# Patient Record
Sex: FEMALE | Race: BLACK OR AFRICAN AMERICAN | NOT HISPANIC OR LATINO | URBAN - METROPOLITAN AREA
[De-identification: names, ages, dates, MRNs, and addresses within clinical notes are randomized per-mention and may not be internally consistent; named-entity substitution may affect disease eponyms.]

---

## 2018-11-01 ENCOUNTER — OUTPATIENT (OUTPATIENT)
Dept: OUTPATIENT SERVICES | Facility: HOSPITAL | Age: 33
LOS: 1 days | End: 2018-11-01
Payer: MEDICAID

## 2018-11-01 PROCEDURE — G9001: CPT

## 2018-11-17 ENCOUNTER — EMERGENCY (EMERGENCY)
Facility: HOSPITAL | Age: 33
LOS: 1 days | Discharge: HOME | End: 2018-11-17
Attending: EMERGENCY MEDICINE
Payer: SELF-PAY

## 2018-11-17 VITALS
SYSTOLIC BLOOD PRESSURE: 164 MMHG | HEIGHT: 69 IN | RESPIRATION RATE: 18 BRPM | HEART RATE: 88 BPM | WEIGHT: 149.91 LBS | DIASTOLIC BLOOD PRESSURE: 98 MMHG | TEMPERATURE: 98 F | OXYGEN SATURATION: 100 %

## 2018-11-17 PROCEDURE — 99284 EMERGENCY DEPT VISIT MOD MDM: CPT

## 2018-11-17 PROCEDURE — 70450 CT HEAD/BRAIN W/O DYE: CPT

## 2018-11-17 PROCEDURE — 99284 EMERGENCY DEPT VISIT MOD MDM: CPT | Mod: 25

## 2018-11-17 PROCEDURE — 70450 CT HEAD/BRAIN W/O DYE: CPT | Mod: 26

## 2018-11-17 RX ORDER — ACETAMINOPHEN 500 MG
975 TABLET ORAL ONCE
Qty: 0 | Refills: 0 | Status: COMPLETED | OUTPATIENT
Start: 2018-11-17 | End: 2018-11-17

## 2018-11-17 RX ADMIN — Medication 975 MILLIGRAM(S): at 20:03
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
## 2018-11-26 DIAGNOSIS — Z71.89 OTHER SPECIFIED COUNSELING: ICD-10-CM

## 2018-12-04 ENCOUNTER — RESULT REVIEW (OUTPATIENT)
Age: 33
End: 2018-12-04

## 2019-12-16 ENCOUNTER — EMERGENCY (EMERGENCY)
Facility: HOSPITAL | Age: 34
LOS: 1 days | Discharge: HOME | End: 2019-12-16
Attending: EMERGENCY MEDICINE
Payer: MEDICAID

## 2019-12-16 VITALS
HEIGHT: 69 IN | WEIGHT: 156.09 LBS | DIASTOLIC BLOOD PRESSURE: 78 MMHG | OXYGEN SATURATION: 100 % | SYSTOLIC BLOOD PRESSURE: 118 MMHG | HEART RATE: 83 BPM | TEMPERATURE: 98 F | RESPIRATION RATE: 18 BRPM

## 2019-12-16 PROCEDURE — 99283 EMERGENCY DEPT VISIT LOW MDM: CPT | Mod: 25

## 2019-12-16 PROCEDURE — 10060 I&D ABSCESS SIMPLE/SINGLE: CPT

## 2019-12-16 PROCEDURE — 10060 I&D ABSCESS SIMPLE/SINGLE: CPT | Mod: RT

## 2019-12-16 NOTE — ED ADULT NURSE NOTE - CHIEF COMPLAINT QUOTE
c/o pain and cysts to right axilla, had surgery 3 weeks ago on left axilla to have  cysts removed, and now wound is leaking and not healing

## 2019-12-16 NOTE — ED STATDOCS - OBJECTIVE STATEMENT
33 y/o F pt with hx of hydradenitis suppurativa 3 weeks s/p left axilla cyst removal presents c/o right axilla cyst. Denies fever or chills. No further complaints at this time.  Plastic: Dr. Lomeli with [facility]

## 2019-12-16 NOTE — ED ADULT TRIAGE NOTE - CHIEF COMPLAINT QUOTE
c/o pain to right axilla, had surgery 3 weeks ago , states had cysts removed, and now wound is leaking and not healing c/o pain and cysts to right axilla, had surgery 3 weeks ago on left axilla to have  cysts removed, and now wound is leaking and not healing

## 2019-12-16 NOTE — ED STATDOCS - ATTENDING CONTRIBUTION TO CARE
I, Candice Lema, performed the initial face to face bedside interview with this patient regarding history of present illness, review of symptoms and relevant past medical, social and family history.  I completed an independent physical examination.  I was the initial provider who evaluated this patient. I have signed out the follow up of any pending tests (i.e. labs, radiological studies) to the ACP.  I have communicated the patient’s plan of care and disposition with the ACP.  The history, relevant review of systems, past medical and surgical history, medical decision making, and physical examination was documented by the scribe in my presence and I attest to the accuracy of the documentation.

## 2019-12-16 NOTE — ED STATDOCS - PROGRESS NOTE DETAILS
incison and drainage preformed, antibiotics to pharmacy, pt has appt with surgeon on friday will f/u return for worsening symptoms warm compressess

## 2020-09-24 PROBLEM — Z00.00 ENCOUNTER FOR PREVENTIVE HEALTH EXAMINATION: Status: ACTIVE | Noted: 2020-09-24

## 2020-09-25 ENCOUNTER — APPOINTMENT (OUTPATIENT)
Dept: BREAST CENTER | Facility: CLINIC | Age: 35
End: 2020-09-25
Payer: MEDICAID

## 2020-09-25 VITALS
HEART RATE: 91 BPM | OXYGEN SATURATION: 99 % | HEIGHT: 69 IN | WEIGHT: 210 LBS | SYSTOLIC BLOOD PRESSURE: 124 MMHG | DIASTOLIC BLOOD PRESSURE: 83 MMHG | BODY MASS INDEX: 31.1 KG/M2 | TEMPERATURE: 98.4 F

## 2020-09-25 DIAGNOSIS — R92.8 OTHER ABNORMAL AND INCONCLUSIVE FINDINGS ON DIAGNOSTIC IMAGING OF BREAST: ICD-10-CM

## 2020-09-25 DIAGNOSIS — L73.2 HIDRADENITIS SUPPURATIVA: ICD-10-CM

## 2020-09-25 DIAGNOSIS — N64.4 MASTODYNIA: ICD-10-CM

## 2020-09-25 PROCEDURE — 99204 OFFICE O/P NEW MOD 45 MIN: CPT

## 2020-09-25 NOTE — ASSESSMENT
[FreeTextEntry1] : 35 yo presents with abnormal imaging and suppurativa hidradenitis.  She has raised red bumps in both axillas.  Recommendation for topical antibiotics and repeat short term imaging.  Clinical exam is to follow.\par 1.  Topical antibiotics for 2 weeks BID\par 2.  Naproxen for pain PRN\par 3.  Short term follow up imaging in 6 months 2/2020\par 4.  Follow up in 6 months 2/2020

## 2020-09-25 NOTE — PHYSICAL EXAM
[Normocephalic] : normocephalic [Atraumatic] : atraumatic [EOMI] : extra ocular movement intact [PERRL] : pupils equal, round and reactive to light [Sclera nonicteric] : sclera nonicteric [Supple] : supple [No Supraclavicular Adenopathy] : no supraclavicular adenopathy [Examined in the supine and seated position] : examined in the supine and seated position [No dominant masses] : no dominant masses in right breast  [No dominant masses] : no dominant masses left breast [No Nipple Retraction] : no left nipple retraction [No Nipple Discharge] : no left nipple discharge [No Axillary Lymphadenopathy] : no left axillary lymphadenopathy [No Edema] : no edema [No Rashes] : no rashes [No Ulceration] : no ulceration [de-identified] : No supraclavicular or axillary adenopathy. No dominant masses, normal to palpation. Everted nipple without discharge. No skin changes.\par \par  [de-identified] : No supraclavicular or axillary adenopathy. No dominant masses, normal to palpation. Everted nipple without discharge. No skin changes.\par \par  [de-identified] : raised red bumps in both axillas

## 2020-09-25 NOTE — HISTORY OF PRESENT ILLNESS
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  She states she has been followed by her OG-GYN for years for her chronic hidradenitis suppurative.  She stopped shaving her arm pit a long time ago and only applies deodarant sometimes.  She reported to her GYN she had bilateral breast tenderness and underwent imaging on 8/25/2020.  She was recommended to undergo repeat short term bilateral US due in Febraury due to bilateral axillary lymph nodes.\par \par Impression:  No mammographic or ultrasonographic evidence of malignancy.  Recommend follow up bilateral mammogram at age 40 or earlier if clinically indicated.  The patient notes intermittent bilateral breast tenderness.  The patient states that her provider noted palpable  areas of concern under both arms but could not specify the exact location of the areas noted by her provider.  Hypocholic mass with in the skin are noted in the axillary region bilaterally on sonography.  The patient notes that these have been present for the past 5 years.  The largest measures up to 0.7 cm.  These are most consistent with sebaceus cyst.  A superimposed infection cannot be excluded.  Further imaging may be obtained if clinical symptoms persist, to ensure resolution or if there is clinical concern for breast cancer.\par Survey sonography of the right axillary region demonstrates a mildly prominent lymph node with slight smooth cortical thickening as well as a preserved fatty hilum.  This may be reactive in etiology.  Short term interval with sonography in 6 months is recommended to ensure stability.  BIRADS 3\par \par I reviewed clinical exam and recent imaging.  Underneath both axilla there are raised red bumps and I explained what hidradenitis suppurative is and the management.  She understands she is not to shave or put deodorant on while she is having a flare up.  She is to apply topical antibiotics to the area BID for 2 weeks.  If clinical breast exam is stable and benign then short term follow up imaging in February with clinical exam to follow.  She understands and agrees to plan.  Any changes in her breast, then she is to call the office for a sooner appointment.

## 2020-10-01 ENCOUNTER — EMERGENCY (EMERGENCY)
Facility: HOSPITAL | Age: 35
LOS: 1 days | Discharge: HOME | End: 2020-10-01
Attending: EMERGENCY MEDICINE
Payer: MEDICAID

## 2020-10-01 VITALS
DIASTOLIC BLOOD PRESSURE: 77 MMHG | HEIGHT: 69 IN | TEMPERATURE: 98 F | OXYGEN SATURATION: 100 % | SYSTOLIC BLOOD PRESSURE: 121 MMHG | HEART RATE: 96 BPM | WEIGHT: 160.06 LBS | RESPIRATION RATE: 18 BRPM

## 2020-10-01 PROCEDURE — 10061 I&D ABSCESS COMP/MULTIPLE: CPT

## 2020-10-01 PROCEDURE — 99283 EMERGENCY DEPT VISIT LOW MDM: CPT | Mod: 25

## 2020-10-01 PROCEDURE — 99282 EMERGENCY DEPT VISIT SF MDM: CPT | Mod: 25

## 2020-10-01 NOTE — ED PROVIDER NOTE - PATIENT PORTAL LINK FT
You can access the FollowMyHealth Patient Portal offered by [facility] by registering at the following website: [email or web address]. By joining [employer]’s FollowMyHealth portal, you will also be able to view your health information using other applications (apps) compatible with our system.

## 2020-10-01 NOTE — ED PROVIDER NOTE - PHYSICAL EXAMINATION
Skin: bilateral hydradenitis, with 1cm area of fluctuance and tenderness on left axilla, no erythema

## 2020-10-01 NOTE — ED PROVIDER NOTE - OBJECTIVE STATEMENT
34 y/o F with hx of hydradenitis c/o abscess on left axilla.  Patient is following up with a surgeon.  Denies fever.  Patient is using topical clindamycin.

## 2020-10-01 NOTE — ED ADULT TRIAGE NOTE - CHIEF COMPLAINT QUOTE
c/o pain to both armpit areas, states have abcesses to both, c/o pain, went to [facility] yesterday was given a med, feels it made it worse

## 2020-10-13 ENCOUNTER — APPOINTMENT (OUTPATIENT)
Dept: SURGERY | Facility: CLINIC | Age: 35
End: 2020-10-13
Payer: MEDICAID

## 2020-10-13 ENCOUNTER — APPOINTMENT (OUTPATIENT)
Dept: SURGERY | Facility: CLINIC | Age: 35
End: 2020-10-13

## 2020-10-13 VITALS
HEART RATE: 84 BPM | SYSTOLIC BLOOD PRESSURE: 147 MMHG | TEMPERATURE: 98.3 F | HEIGHT: 69 IN | DIASTOLIC BLOOD PRESSURE: 94 MMHG | WEIGHT: 213 LBS | BODY MASS INDEX: 31.55 KG/M2 | OXYGEN SATURATION: 100 %

## 2020-10-13 DIAGNOSIS — L73.2 HIDRADENITIS SUPPURATIVA: ICD-10-CM

## 2020-10-13 PROCEDURE — 99214 OFFICE O/P EST MOD 30 MIN: CPT

## 2020-10-14 PROBLEM — L73.2 SUPPURATIVE HIDRADENITIS: Status: ACTIVE | Noted: 2020-09-25

## 2020-10-14 NOTE — PHYSICAL EXAM
[Normocephalic] : normocephalic [Atraumatic] : atraumatic [EOMI] : extra ocular movement intact [PERRL] : pupils equal, round and reactive to light [Sclera nonicteric] : sclera nonicteric [Supple] : supple [No Supraclavicular Adenopathy] : no supraclavicular adenopathy [Examined in the supine and seated position] : examined in the supine and seated position [No Axillary Lymphadenopathy] : no left axillary lymphadenopathy [No Edema] : no edema [No Rashes] : no rashes [de-identified] : \par \par  [No Ulceration] : no ulceration [de-identified] : \par \par  [de-identified] : raised red bumps in both axillas

## 2020-10-14 NOTE — HISTORY OF PRESENT ILLNESS
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  She states she has been followed by her OG-GYN for years for her chronic hidradenitis suppurative.  She stopped shaving her arm pit a long time ago and only applies deodarant sometimes.  She reported to her GYN she had bilateral breast tenderness and underwent imaging on 8/25/2020.  She was recommended to undergo repeat short term bilateral US due in Febraury due to bilateral axillary lymph nodes.\par \par Impression:  No mammographic or ultrasonographic evidence of malignancy.  Recommend follow up bilateral mammogram at age 40 or earlier if clinically indicated.  The patient notes intermittent bilateral breast tenderness.  The patient states that her provider noted palpable  areas of concern under both arms but could not specify the exact location of the areas noted by her provider.  Hypocholic mass with in the skin are noted in the axillary region bilaterally on sonography.  The patient notes that these have been present for the past 5 years.  The largest measures up to 0.7 cm.  These are most consistent with sebaceus cyst.  A superimposed infection cannot be excluded.  Further imaging may be obtained if clinical symptoms persist, to ensure resolution or if there is clinical concern for breast cancer.\par Survey sonography of the right axillary region demonstrates a mildly prominent lymph node with slight smooth cortical thickening as well as a preserved fatty hilum.  This may be reactive in etiology.  Short term interval with sonography in 6 months is recommended to ensure stability.  BIRADS 3\par \par I reviewed clinical exam and it is progressing even with topical antibiotics and sitz bath.  We discussed her meeting with a plastics surgeon for surgery due to her hidradenitis suppurativa.  She agrees with plan and will consult with  for surgery.  All questions answered.

## 2020-10-14 NOTE — ASSESSMENT
[FreeTextEntry1] : 33 yo presents for follow up secondary to suppurativa hidradenitis.  She has raised red bumps in both axillas.  I reviewed clinical exam and it is progressing even with topical antibiotics and sitz bath.  We discussed her meeting with a plastics surgeon for surgery due to her hidradenitis suppurativa.  She agrees with plan and will consult with  for surgery.\par 1.  Consult with Dr. Munroe

## 2020-11-18 ENCOUNTER — APPOINTMENT (OUTPATIENT)
Dept: PLASTIC SURGERY | Facility: CLINIC | Age: 35
End: 2020-11-18

## 2020-11-25 ENCOUNTER — TRANSCRIPTION ENCOUNTER (OUTPATIENT)
Age: 35
End: 2020-11-25

## 2022-04-18 ENCOUNTER — NON-APPOINTMENT (OUTPATIENT)
Age: 37
End: 2022-04-18

## 2022-04-20 ENCOUNTER — APPOINTMENT (OUTPATIENT)
Dept: PULMONOLOGY | Facility: CLINIC | Age: 37
End: 2022-04-20
Payer: MEDICAID

## 2022-04-20 ENCOUNTER — NON-APPOINTMENT (OUTPATIENT)
Age: 37
End: 2022-04-20

## 2022-04-20 VITALS — BODY MASS INDEX: 32.64 KG/M2 | WEIGHT: 221 LBS

## 2022-04-20 VITALS
DIASTOLIC BLOOD PRESSURE: 70 MMHG | SYSTOLIC BLOOD PRESSURE: 120 MMHG | RESPIRATION RATE: 16 BRPM | HEART RATE: 73 BPM | OXYGEN SATURATION: 96 %

## 2022-04-20 DIAGNOSIS — R91.1 SOLITARY PULMONARY NODULE: ICD-10-CM

## 2022-04-20 DIAGNOSIS — Z87.2 PERSONAL HISTORY OF DISEASES OF THE SKIN AND SUBCUTANEOUS TISSUE: ICD-10-CM

## 2022-04-20 DIAGNOSIS — Z87.891 PERSONAL HISTORY OF NICOTINE DEPENDENCE: ICD-10-CM

## 2022-04-20 DIAGNOSIS — Z78.9 OTHER SPECIFIED HEALTH STATUS: ICD-10-CM

## 2022-04-20 DIAGNOSIS — Z82.49 FAMILY HISTORY OF ISCHEMIC HEART DISEASE AND OTHER DISEASES OF THE CIRCULATORY SYSTEM: ICD-10-CM

## 2022-04-20 DIAGNOSIS — R06.02 SHORTNESS OF BREATH: ICD-10-CM

## 2022-04-20 DIAGNOSIS — E66.9 OBESITY, UNSPECIFIED: ICD-10-CM

## 2022-04-20 PROCEDURE — 99204 OFFICE O/P NEW MOD 45 MIN: CPT

## 2022-04-20 RX ORDER — MEDROXYPROGESTERONE ACETATE 150 MG/ML
150 INJECTION, SUSPENSION INTRAMUSCULAR
Refills: 0 | Status: COMPLETED | COMMUNITY
End: 2022-04-20

## 2022-04-20 RX ORDER — MUPIROCIN 2 G/100G
2 CREAM TOPICAL TWICE DAILY
Qty: 1 | Refills: 0 | Status: COMPLETED | COMMUNITY
Start: 2020-09-25 | End: 2022-04-20

## 2022-04-20 RX ORDER — NAPROXEN 500 MG/1
500 TABLET, DELAYED RELEASE ORAL
Qty: 60 | Refills: 0 | Status: COMPLETED | COMMUNITY
Start: 2020-09-25 | End: 2022-04-20

## 2022-04-20 RX ORDER — CLINDAMYCIN PHOSPHATE 10 MG/ML
1 LOTION TOPICAL TWICE DAILY
Qty: 1 | Refills: 0 | Status: COMPLETED | COMMUNITY
Start: 2020-09-30 | End: 2022-04-20

## 2022-04-20 NOTE — REVIEW OF SYSTEMS
[SOB on Exertion] : sob on exertion [Chest Discomfort] : chest discomfort [Anemia] : anemia [Negative] : Endocrine [Back Pain] : no back pain

## 2022-04-20 NOTE — DISCUSSION/SUMMARY
[FreeTextEntry1] : \par #1. Will schedule PFTs in near future to assess lung function \par #2. The patient does not appear to require chronic BD therapy at this time\par #3. Diet and exercise for weight loss\par #4. SOBOE is likely at least somewhat related to weight or deconditioning \par #5. Repeat chest CT in 3 months to re-evaluate 8 mm nodule; low risk for malignancy given young age and brief smoking hx (< 10 pk yrs and quit in 2017)\par #6. Pt had both Covid vaccines; rec booster\par #7. Discomfort and SOB may be musculoskeletal as it is reproducible with deep inspiration\par #8. F/u in 4-6 weeks with PFTs and films\par #9. Reviewed risks of exposure and symptoms of Covid-19 virus, including how the virus is spread and precautions to avoid monica virus.\par \par The patient expressed understanding and agreement with the above recommendations/plan and accepts responsibility to be compliant with recommended testing, therapies, and f/u visits.\par All relevant questions and concerns were addressed.

## 2022-04-20 NOTE — PHYSICAL EXAM
[No Acute Distress] : no acute distress [Well Nourished] : well nourished [Well Developed] : well developed [Normal Appearance] : normal appearance [Supple] : supple [Normal Rate/Rhythm] : normal rate/rhythm [Normal S1, S2] : normal s1, s2 [No Murmurs] : no murmurs [No Resp Distress] : no resp distress [No Acc Muscle Use] : no acc muscle use [Normal Rhythm and Effort] : normal rhythm and effort [Clear to Auscultation Bilaterally] : clear to auscultation bilaterally [No Abnormalities] : no abnormalities [Benign] : benign [Not Tender] : not tender [Soft] : soft [No Clubbing] : no clubbing [No Edema] : no edema [Oriented x3] : oriented x3 [No Focal Deficits] : no focal deficits [TextBox_2] : Pt is obese

## 2022-04-20 NOTE — REASON FOR VISIT
[Initial] : an initial visit [Abnormal CXR/ Chest CT] : an abnormal CXR/ chest CT [Pulmonary Nodules] : pulmonary nodules [Shortness of Breath] : shortness of breath [Obesity] : obesity [TextBox_44] : Smoking hx

## 2022-04-20 NOTE — CONSULT LETTER
[Dear  ___] : Dear  [unfilled], [Consult Letter:] : I had the pleasure of evaluating your patient, [unfilled]. [Please see my note below.] : Please see my note below. [Consult Closing:] : Thank you very much for allowing me to participate in the care of this patient.  If you have any questions, please do not hesitate to contact me. [Sincerely,] : Sincerely, [FreeTextEntry3] : Leander Vyas MD, FCCP, D. ABSM\par Pulmonary and Sleep Medicine\par [facility] at [place]

## 2022-04-20 NOTE — HISTORY OF PRESENT ILLNESS
[Former] : former [Never] : never [On ___] : performed on [unfilled] [Patient] : the patient [To Assess ___] : to assess [unfilled] [Initial Evaluation] : an initial evaluation of [Excess Weight] : excess weight [Currently Experiencing] : The patient is currently experiencing symptoms. [Dyspnea] : dyspnea [Low Calorie Diet] : low calorie diet [Fair Compliance] : fair compliance with treatment [Fair Tolerance] : fair tolerance of treatment [Poor Symptom Control] : poor symptom control [None] : No associated conditions are noted [High] : high [Low Calorie] : low calorie [Well Balanced Diet] : well balanced meals [Infrequently] : exercises infrequently [TextBox_4] : Pt reports a prior RUL opacity thought to be an abscess but she reports nothing was done. She denies drainage or abx. Currently there appears to be a small residual scar in that area but reportedly is essentially resolved. This may be contributing to her chest discomfort if the pleura was involved at that time and now may have scarring.\par Pt was seen in [facility] for chest pain and SOB. W/u including CTA revealed an 8 mm peripheral nodule but no PE.\par Pt reports that she woke up with these symptoms and are reproducible with deep breathing but relatively asymptomatic at rest.\par < 10 pk yr smoker, quit 2017.\par  [TextBox_11] : 1 [TextBox_13] : 10 [YearQuit] : 2017 [FreeTextEntry9] : Chest CT [FreeTextEntry8] : No PE but with 8 mm RLL nodule

## 2022-06-01 ENCOUNTER — APPOINTMENT (OUTPATIENT)
Dept: PULMONOLOGY | Facility: CLINIC | Age: 37
End: 2022-06-01

## 2022-06-15 ENCOUNTER — NON-APPOINTMENT (OUTPATIENT)
Age: 37
End: 2022-06-15

## 2022-07-25 ENCOUNTER — EMERGENCY (EMERGENCY)
Facility: HOSPITAL | Age: 37
LOS: 0 days | Discharge: HOME | End: 2022-07-25
Attending: EMERGENCY MEDICINE
Payer: COMMERCIAL

## 2022-07-25 VITALS
HEART RATE: 64 BPM | TEMPERATURE: 98 F | RESPIRATION RATE: 18 BRPM | SYSTOLIC BLOOD PRESSURE: 154 MMHG | OXYGEN SATURATION: 100 % | DIASTOLIC BLOOD PRESSURE: 95 MMHG

## 2022-07-25 VITALS — WEIGHT: 220.02 LBS | HEIGHT: 69 IN

## 2022-07-25 DIAGNOSIS — M54.2 CERVICALGIA: ICD-10-CM
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
## 2022-07-25 DIAGNOSIS — F84.0 AUTISTIC DISORDER: ICD-10-CM
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
## 2022-07-25 DIAGNOSIS — S16.1XXA STRAIN OF MUSCLE, FASCIA AND TENDON AT NECK LEVEL, INITIAL ENCOUNTER: ICD-10-CM

## 2022-07-25 DIAGNOSIS — R51.9 HEADACHE, UNSPECIFIED: ICD-10-CM

## 2022-07-25 PROCEDURE — 99283 EMERGENCY DEPT VISIT LOW MDM: CPT

## 2022-07-25 RX ORDER — IBUPROFEN 200 MG
600 TABLET ORAL ONCE
Refills: 0 | Status: COMPLETED | OUTPATIENT
Start: 2022-07-25 | End: 2022-07-25

## 2022-07-25 RX ORDER — CYCLOBENZAPRINE HYDROCHLORIDE 10 MG/1
1 TABLET, FILM COATED ORAL
Qty: 15 | Refills: 0
Start: 2022-07-25 | End: 2022-07-29

## 2022-07-25 RX ADMIN — Medication 600 MILLIGRAM(S): at 11:52
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
## 2022-07-25 NOTE — ED STATDOCS - ATTENDING SHARED VISIT SELECTORS
Addended by: GEORGI CORBIN on: 2/16/2018 11:40 AM     Modules accepted: Orders    
History/Exam/Medical Decision Making

## 2022-07-25 NOTE — ED STATDOCS - PATIENT PORTAL LINK FT
You can access the FollowMyHealth Patient Portal offered by [facility] by registering at the following website: [email or web address]. By joining [employer]’s FollowMyHealth portal, you will also be able to view your health information using other applications (apps) compatible with our system.

## 2022-07-25 NOTE — ED STATDOCS - NSFOLLOWUPINSTRUCTIONS_ED_ALL_ED_FT
Cervical Sprain      A cervical sprain is a stretch or tear in one or more of the ligaments in the neck. Ligaments are the tissues that connect bones. Cervical sprains can range from mild to severe. Severe cervical sprains can cause the spinal bones (vertebrae) in the neck to be unstable. This can result in spinal cord damage and in serious nervous system problems.    The time that it takes for a cervical sprain to heal depends on the cause and extent of the injury. Most cervical sprains heal in 4–6 weeks.      What are the causes?    Cervical sprains may be caused by trauma, such as an injury from a motor vehicle accident, a fall, or a sudden forward and backward whipping movement of the head and neck (whiplash injury). Mild cervical sprains may be caused by wear and tear over time.      What increases the risk?    The following factors may make you more likely to develop this condition:  •Participating in activities that have a high risk of trauma to the neck. These include contact sports, auto racing, gymnastics, and diving.      •Taking risks when driving or riding in a motor vehicle.      •Osteoarthritis of the spine.      •Poor strength and flexibility of the neck.      •A previous neck injury.      •Poor posture.      •Spending long periods in certain positions that put stress on the neck, such as sitting at a computer for a long time.        What are the signs or symptoms?    Symptoms of this condition include:  •Pain, soreness, stiffness, tenderness, swelling, or a burning sensation in the front, back, or sides of the neck, shoulders, or upper back.      •Sudden tightening of neck muscles (spasms).      •Limited ability to move the neck.      •Headache.      •Dizziness.      •Nausea or vomiting.      •Weakness, numbness, or tingling in a hand or an arm.      Symptoms may develop right away after injury, or they may develop over a few days. In some cases, symptoms may go away with treatment and return (recur) over time.      How is this diagnosed?    This condition may be diagnosed based on:  •Your medical history.      •Your symptoms.      •Any recent injuries or known neck problems that you have, such as arthritis in the neck.      •A physical exam.      •Imaging tests, such as X-rays, MRI, and CT scan.        How is this treated?    This condition is treated by resting and icing the injured area and doing physical therapy exercises. Heat therapy may be used 2–3 days after the injury occurred if there is no swelling. Depending on the severity of your condition, treatment may also include:•Keeping your neck in place (immobilized) for periods of time. This may be done using:  •A cervical collar. This supports your chin and the back of your head.      •A cervical traction device. This is a sling that holds up your head. The device removes weight and pressure from your neck, and it may help to relieve pain.        •Medicines that help to relieve pain and inflammation.      •Medicines that help to relax your muscles (muscle relaxants).      •Surgery. This is rare.        Follow these instructions at home:      Medicines      •Take over-the-counter and prescription medicines only as told by your health care provider.    •Ask your health care provider if the medicine prescribed to you:  •Requires you to avoid driving or using heavy machinery.    •Can cause constipation. You may need to take these actions to prevent or treat constipation:  •Drink enough fluid to keep your urine pale yellow.      •Take over-the-counter or prescription medicines.      •Eat foods that are high in fiber, such as beans, whole grains, and fresh fruits and vegetables.      •Limit foods that are high in fat and processed sugars, such as fried or sweet foods.          If you have a cervical collar:     •Wear the collar as told by your health care provider. Do not remove it unless told.      •Ask before making any adjustments to your collar.      •If you have long hair, keep it outside of the collar.    •Ask your health care provider if you may remove the collar for cleaning and bathing. If so:  •Follow instructions about how to remove it safely.      •Clean it by hand with mild soap and water and air-dry it completely.      •If your collar has removable pads, remove them every 1–2 days and wash them by hand with soap and water. Let them air-dry completely before putting them back in the collar.        •Tell your health care provider if your skin under the collar has irritation or sores.        Managing pain, stiffness, and swelling                   •If directed, use a cervical traction device as told.    •If directed, put ice on the affected area. To do this:  •Put ice in a plastic bag.      •Place a towel between your skin and the bag.      •Leave the ice on for 20 minutes, 2–3 times a day.      •If directed, apply heat to the affected area before you do your physical therapy or as often as told by your health care provider. Use the heat source that your health care provider recommends, such as a moist heat pack or a heating pad.  •Place a towel between your skin and the heat source.      •Leave the heat on for 20–30 minutes.      •Remove the heat if your skin turns bright red. This is especially important if you are unable to feel pain, heat, or cold. You may have a greater risk of getting burned.        Activity     • Do not drive while wearing a cervical collar. If you do not have a cervical collar, ask if it is safe to drive while your neck heals.      • Do not lift anything that is heavier than 10 lb (4.5 kg), or the limit that you are told, until your health care provider says that it is safe.      •Rest as told by your health care provider.      •If physical therapy was prescribed, do exercises as told by your health care provider or physical therapist.      •Return to your normal activities as told by your health care provider. Avoid positions and activities that make your symptoms worse. Ask your health care provider what activities are safe for you.      General instructions     • Do not use any products that contain nicotine or tobacco, such as cigarettes, e-cigarettes, and chewing tobacco. These can delay healing. If you need help quitting, ask your health care provider.      •Keep all follow-up visits as told by your health care provider or physical therapist. This is important.        How is this prevented?    To prevent a cervical sprain from happening again:  •Use and maintain good posture. Make any needed adjustments to your workstation to help you do this.      •Exercise regularly as told by your health care provider or physical therapist.      •Avoid risky activities that may cause a cervical sprain.        Contact a health care provider if you have:    •Symptoms that get worse or do not get better after 2 weeks of treatment.      •Pain that gets worse or does not get better with medicine.      •New, unexplained symptoms.      •Sores or irritated skin on your neck from wearing your cervical collar.        Get help right away if:    •You have severe pain.      •You develop numbness, tingling, or weakness in any part of your body.      •You cannot move a part of your body (you have paralysis).      •You have neck pain along with severe dizziness or headache.        Summary    •A cervical sprain is a stretch or tear in one or more of the ligaments in the neck.      •Cervical sprains may be caused by trauma, such as an injury from a motor vehicle accident, a fall, or a sudden forward and backward whipping movement of the head and neck (whiplash injury).      •Symptoms may develop right away after injury, or they may develop over a few days.      •This condition may be treated with rest, ice, heat, medicines, physical therapy, and surgery.

## 2022-07-25 NOTE — ED STATDOCS - NS ED ROS FT
Constitutional: No fever or chills  Eyes: No visual changes  HEENT: No throat pain  CV: No chest pain  Resp: No SOB no cough  GI: No abd pain, nausea or vomiting  : No dysuria  MSK: +neck pain   Skin: No rash  Neuro: + headache
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
## 2022-07-25 NOTE — ED STATDOCS - PHYSICAL EXAMINATION
Constitutional: NAD AOx3  Eyes: PERRL EOMI  Head: Normocephalic atraumatic  Mouth: MMM  Cardiac: regular rate and rhythm  Resp: Lungs CTAB  GI: Abd s/nd/nt  Neuro: CN2-12 grossly intact, ORELLANA x 4  Skin: No visible rashes  MSK: b/l cervical paraspinal TTP.
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
## 2022-07-25 NOTE — ED STATDOCS - NS ED ATTENDING STATEMENT MOD
This was a shared visit with the MARIA ELENA. I reviewed and verified the documentation and independently performed the documented:

## 2023-06-06 ENCOUNTER — OUTPATIENT (OUTPATIENT)
Dept: OUTPATIENT SERVICES | Facility: HOSPITAL | Age: 38
LOS: 1 days | End: 2023-06-06

## 2023-06-06 VITALS
DIASTOLIC BLOOD PRESSURE: 90 MMHG | RESPIRATION RATE: 16 BRPM | SYSTOLIC BLOOD PRESSURE: 137 MMHG | HEART RATE: 76 BPM | OXYGEN SATURATION: 98 % | TEMPERATURE: 98 F | WEIGHT: 222.01 LBS | HEIGHT: 69 IN

## 2023-06-06 DIAGNOSIS — Z30.9 ENCOUNTER FOR CONTRACEPTIVE MANAGEMENT, UNSPECIFIED: ICD-10-CM

## 2023-06-06 LAB
BLD GP AB SCN SERPL QL: NEGATIVE — SIGNIFICANT CHANGE UP
HCG UR QL: NEGATIVE — SIGNIFICANT CHANGE UP
HCT VFR BLD CALC: 37.9 % — SIGNIFICANT CHANGE UP (ref 34.5–45)
HGB BLD-MCNC: 11.6 G/DL — SIGNIFICANT CHANGE UP (ref 11.5–15.5)
MCHC RBC-ENTMCNC: 26.4 PG — LOW (ref 27–34)
MCHC RBC-ENTMCNC: 30.6 GM/DL — LOW (ref 32–36)
MCV RBC AUTO: 86.1 FL — SIGNIFICANT CHANGE UP (ref 80–100)
NRBC # BLD: 0 /100 WBCS — SIGNIFICANT CHANGE UP (ref 0–0)
NRBC # FLD: 0 K/UL — SIGNIFICANT CHANGE UP (ref 0–0)
PLATELET # BLD AUTO: 368 K/UL — SIGNIFICANT CHANGE UP (ref 150–400)
RBC # BLD: 4.4 M/UL — SIGNIFICANT CHANGE UP (ref 3.8–5.2)
RBC # FLD: 14.9 % — HIGH (ref 10.3–14.5)
RH IG SCN BLD-IMP: POSITIVE — SIGNIFICANT CHANGE UP
WBC # BLD: 15.93 K/UL — HIGH (ref 3.8–10.5)
WBC # FLD AUTO: 15.93 K/UL — HIGH (ref 3.8–10.5)

## 2023-06-06 RX ORDER — SODIUM CHLORIDE 9 MG/ML
1000 INJECTION, SOLUTION INTRAVENOUS
Refills: 0 | Status: DISCONTINUED | OUTPATIENT
Start: 2023-06-13 | End: 2023-06-27

## 2023-06-06 NOTE — H&P PST ADULT - ATTENDING COMMENTS
36 yo P23LMP 3wks ago presents for laparoscopic bilateral salpingectomy. Patient states she has been on many contraceptive options and states she no longer wants children. Patient requesting sterilization and understands procedure is permanent and not reversible.  Patient also declines long term reversible method with IUD. Patient has 18 yr, 16 yr and 5yr children and states her family is complete. Consent signed and witnessed. Risks and benefits discussed. Risks include but not limited to scar tissue formation, intraop blood loss, anesthesia risks, injury to adjacent organs, etc. All questions and concerns addressed to full satisfaction. Anticipate uncomplicated intraop and postop course. Rosa Isela

## 2023-06-06 NOTE — H&P PST ADULT - LOCATION OF BACK PAIN
herniated discs, follows pain management, pain on and off, does not require prescription medication/lumbar spine

## 2023-06-06 NOTE — H&P PST ADULT - HISTORY OF PRESENT ILLNESS
36 y/o female presents to presurgical testing with diagnosis of encounter for contraceptive management. Pt states her family is complete. Pt used to be on depo injection and is choosing to undergo a permanent method of contraception. Pt is scheduled for a laparoscopic bilateral salpingectomy.

## 2023-06-06 NOTE — H&P PST ADULT - PROBLEM SELECTOR PLAN 1
Patient tentatively scheduled for laparoscopic bilateral salpingectomy for 6/13/23. Pre-op instructions provided. Pt given verbal and written instructions with teach back on chlorhexidine shampoo and pepcid. Pt verbalized understanding with return demonstration.     CBC T&S UCG done.

## 2023-06-08 ENCOUNTER — OUTPATIENT (OUTPATIENT)
Dept: OUTPATIENT SERVICES | Facility: HOSPITAL | Age: 38
LOS: 1 days | End: 2023-06-08

## 2023-06-08 DIAGNOSIS — Z30.9 ENCOUNTER FOR CONTRACEPTIVE MANAGEMENT, UNSPECIFIED: ICD-10-CM

## 2023-06-08 PROBLEM — M51.26 OTHER INTERVERTEBRAL DISC DISPLACEMENT, LUMBAR REGION: Chronic | Status: ACTIVE | Noted: 2023-06-06

## 2023-06-08 LAB
BASOPHILS # BLD AUTO: 0.04 K/UL — SIGNIFICANT CHANGE UP (ref 0–0.2)
BASOPHILS NFR BLD AUTO: 0.3 % — SIGNIFICANT CHANGE UP (ref 0–2)
EOSINOPHIL # BLD AUTO: 0.18 K/UL — SIGNIFICANT CHANGE UP (ref 0–0.5)
EOSINOPHIL NFR BLD AUTO: 1.5 % — SIGNIFICANT CHANGE UP (ref 0–6)
HCT VFR BLD CALC: 36.7 % — SIGNIFICANT CHANGE UP (ref 34.5–45)
HGB BLD-MCNC: 11.6 G/DL — SIGNIFICANT CHANGE UP (ref 11.5–15.5)
IANC: 9.26 K/UL — HIGH (ref 1.8–7.4)
IMM GRANULOCYTES NFR BLD AUTO: 0.5 % — SIGNIFICANT CHANGE UP (ref 0–0.9)
LYMPHOCYTES # BLD AUTO: 1.83 K/UL — SIGNIFICANT CHANGE UP (ref 1–3.3)
LYMPHOCYTES # BLD AUTO: 15.4 % — SIGNIFICANT CHANGE UP (ref 13–44)
MCHC RBC-ENTMCNC: 25.9 PG — LOW (ref 27–34)
MCHC RBC-ENTMCNC: 31.6 GM/DL — LOW (ref 32–36)
MCV RBC AUTO: 81.9 FL — SIGNIFICANT CHANGE UP (ref 80–100)
MONOCYTES # BLD AUTO: 0.55 K/UL — SIGNIFICANT CHANGE UP (ref 0–0.9)
MONOCYTES NFR BLD AUTO: 4.6 % — SIGNIFICANT CHANGE UP (ref 2–14)
NEUTROPHILS # BLD AUTO: 9.26 K/UL — HIGH (ref 1.8–7.4)
NEUTROPHILS NFR BLD AUTO: 77.7 % — HIGH (ref 43–77)
NRBC # BLD: 0 /100 WBCS — SIGNIFICANT CHANGE UP (ref 0–0)
NRBC # FLD: 0 K/UL — SIGNIFICANT CHANGE UP (ref 0–0)
PLATELET # BLD AUTO: 357 K/UL — SIGNIFICANT CHANGE UP (ref 150–400)
RBC # BLD: 4.48 M/UL — SIGNIFICANT CHANGE UP (ref 3.8–5.2)
RBC # FLD: 14.8 % — HIGH (ref 10.3–14.5)
WBC # BLD: 11.92 K/UL — HIGH (ref 3.8–10.5)
WBC # FLD AUTO: 11.92 K/UL — HIGH (ref 3.8–10.5)

## 2023-06-12 ENCOUNTER — TRANSCRIPTION ENCOUNTER (OUTPATIENT)
Age: 38
End: 2023-06-12

## 2023-06-12 NOTE — ASU PATIENT PROFILE, ADULT - ALCOHOL USE HISTORY SINGLE SELECT
Progress Note    Admit Date:  11/22/2021    80 y.o. female with chronic systolic congestive heart failure, nonischemic cardiomyopathy, aortic valve stenosis, pacemaker in place, dementia, hypertension who presented to [facility] ED with complaint of fall at home. She was brought to the ER for evaluation where she was found to have significant bruising to the right side of her face. Imaging obtained showed a right pubic ramus fracture/lateral sacral fracture/small extraperitoneal pelvic hematoma and a small right pelvic sidewall hematoma. She was admitted for further care and evaluation    Subjective:  Denies any pain despite several pelvic fractures. Pleasantly demented. Objective:   No data found. Intake/Output Summary (Last 24 hours) at 11/25/2021 1416  Last data filed at 11/25/2021 0845  Gross per 24 hour   Intake 360 ml   Output --   Net 360 ml     Physical Exam:    Gen: No distress. Alert. Eyes: PERRL. No sclera icterus. No conjunctival injection. ENT: No discharge. Pharynx clear. Neck: Trachea midline. Resp: No accessory muscle use. No crackles. No wheezes. No rhonchi. CV: Regular rate. Regular rhythm. Early systolic murmur noted in the 2nd ICS, right and left sternal borders. No rub. No edema. Peripheral Pulses: +2 palpable, equal bilaterally   GI: Non-tender. Non-distended. Normal bowel sounds. Skin: Warm and dry. No nodule on exposed extremities. No rash on exposed extremities. M/S: No cyanosis. No joint deformity. No clubbing. Neuro: Awake. Grossly nonfocal    Psych: Oriented x 3. No anxiety or agitation.      Scheduled Meds:   influenza virus vaccine  0.5 mL IntraMUSCular Prior to discharge    metoprolol succinate  100 mg Oral Daily    lisinopril  20 mg Oral Daily    sodium chloride flush  5-40 mL IntraVENous 2 times per day    [Held by provider] enoxaparin  40 mg SubCUTAneous Nightly       Continuous Infusions:   sodium chloride         PRN Meds:  sodium chloride control- prn acetaminophen and norco      #Chronic systolic CHF  #Nonischemic CMP  #Aortic stenosis  - No evidence of acute CHF     #Pacemaker in place   - per chart review     #HTN  - BP is elevated yesterday and this morning  - Will increase BP meds today  - metoprolol  mg daily (home dose)  - Lisinopril increase from 5 mg to 20 mg daily (instead of home benazepril 40 mg daily)  - Hold Norvasc  - increase BP meds as BP indicates     #Dementia   - at baseline     DVT Prophylaxis: SCD 2/2 hematomas  Diet: ADULT DIET; Regular  Code Status: Full Code      Stable for discharge - pending placement.      Kanu Pires MD   11/25/2021 2:16 PM never

## 2023-06-13 ENCOUNTER — TRANSCRIPTION ENCOUNTER (OUTPATIENT)
Age: 38
End: 2023-06-13

## 2023-06-13 ENCOUNTER — OUTPATIENT (OUTPATIENT)
Dept: OUTPATIENT SERVICES | Facility: HOSPITAL | Age: 38
LOS: 1 days | Discharge: HOME | End: 2023-06-13
Payer: MEDICAID

## 2023-06-13 VITALS — SYSTOLIC BLOOD PRESSURE: 135 MMHG | HEART RATE: 64 BPM | DIASTOLIC BLOOD PRESSURE: 90 MMHG | OXYGEN SATURATION: 100 %

## 2023-06-13 VITALS
OXYGEN SATURATION: 100 % | HEIGHT: 69 IN | WEIGHT: 222.01 LBS | HEART RATE: 70 BPM | SYSTOLIC BLOOD PRESSURE: 143 MMHG | RESPIRATION RATE: 16 BRPM | DIASTOLIC BLOOD PRESSURE: 88 MMHG | TEMPERATURE: 98 F

## 2023-06-13 DIAGNOSIS — Z30.9 ENCOUNTER FOR CONTRACEPTIVE MANAGEMENT, UNSPECIFIED: ICD-10-CM

## 2023-06-13 LAB — HCG UR QL: NEGATIVE — SIGNIFICANT CHANGE UP

## 2023-06-13 PROCEDURE — 88302 TISSUE EXAM BY PATHOLOGIST: CPT | Mod: 26

## 2023-06-13 RX ORDER — OXYCODONE HYDROCHLORIDE 5 MG/1
5 TABLET ORAL ONCE
Refills: 0 | Status: DISCONTINUED | OUTPATIENT
Start: 2023-06-13 | End: 2023-06-13

## 2023-06-13 RX ORDER — FENTANYL CITRATE 50 UG/ML
25 INJECTION INTRAVENOUS
Refills: 0 | Status: DISCONTINUED | OUTPATIENT
Start: 2023-06-13 | End: 2023-06-13

## 2023-06-13 RX ORDER — ONDANSETRON 8 MG/1
4 TABLET, FILM COATED ORAL ONCE
Refills: 0 | Status: DISCONTINUED | OUTPATIENT
Start: 2023-06-13 | End: 2023-06-27

## 2023-06-13 NOTE — ASU DISCHARGE PLAN (ADULT/PEDIATRIC) - NURSING INSTRUCTIONS
You received IV Tylenol for pain management at 2:15 PM. Please DO NOT take any Tylenol (Acetaminophen) containing products, such as Vicodin, Percocet, Excedrin, and cold medications for the next 6 hours (until 8:15 PM). DO NOT TAKE MORE THAN 3000 MG OF TYLENOL in a 24 hour period.     You received IV Toradol for pain management at 3 PM. Please DO NOT take Motrin/Ibuprofen/Advil/Aleve/NSAIDs (Non-Steroidal Anti-Inflammatory Drugs) for the next 6 hours (until 9 PM).

## 2023-06-13 NOTE — BRIEF OPERATIVE NOTE - OPERATION/FINDINGS
Grossly normal tubes. Left ovary grossly normal. Right ovary with functional cyst. Grossly normal tubes. Left ovary grossly normal. Right ovary with functional cyst.    [id number] Dictation number. MBeauvil

## 2023-06-13 NOTE — ASU DISCHARGE PLAN (ADULT/PEDIATRIC) - ASU DC SPECIAL INSTRUCTIONSFT
Please remove dressings in x3 days. Keep steri strips (little tape strips) on until your post-op visit

## 2023-06-13 NOTE — ASU DISCHARGE PLAN (ADULT/PEDIATRIC) - NS DC INTERPRETER YES NO
-- DO NOT REPLY / DO NOT REPLY ALL --  -- Message is from Engagement Center Operations (ECO) --    General Patient Message: Payam from [facility] calling to speak with the office. They need to confirm it the patient has any chronic health conditions diabetes cardo vas disease and chronic heart failure. Ref#[id number] fax number [phone].    Caller Information       Type Contact Phone/Fax    03/31/2023 05:18 PM CDT Phone (Incoming) Payam ([facility]) (Other) [phone]        Alternative phone number: no    Can a detailed message be left? Yes    Message Turnaround:     Is it Working Hours? No - After Hours/Weekend/Holiday     Did the caller agree that this message can wait until the office reopens in the morning? YES - The Message Can Wait - Send a message to the provider's clinical support pool     [place]:    Please give this turnaround time to the caller:   \"This message will be sent to [state Provider's name]. The clinical team will fulfill your request as soon as they review your message.\"                 No

## 2023-06-13 NOTE — ASU DISCHARGE PLAN (ADULT/PEDIATRIC) - CARE PROVIDER_API CALL
Tianna Woodall  Obstetrics and Gynecology  [address] [place] [address]  Phone: [phone]  Fax: [phone]  Established Patient  Follow Up Time: 2 weeks

## 2023-06-13 NOTE — ASU PREOP CHECKLIST - 2.
HCG  negative ; as per Dr. Woodall and Dr. Jimenez no need for CT UCG  negative ; as per Dr. Woodall and Dr. Jimenez no need for CT

## 2023-06-26 LAB — SURGICAL PATHOLOGY STUDY: SIGNIFICANT CHANGE UP

## 2023-06-30 ENCOUNTER — EMERGENCY (EMERGENCY)
Facility: HOSPITAL | Age: 38
LOS: 1 days | Discharge: HOME | End: 2023-06-30
Attending: EMERGENCY MEDICINE | Admitting: EMERGENCY MEDICINE
Payer: COMMERCIAL

## 2023-06-30 VITALS
DIASTOLIC BLOOD PRESSURE: 72 MMHG | WEIGHT: 225.09 LBS | SYSTOLIC BLOOD PRESSURE: 139 MMHG | HEIGHT: 69 IN | HEART RATE: 69 BPM | RESPIRATION RATE: 18 BRPM | TEMPERATURE: 98 F | OXYGEN SATURATION: 98 %

## 2023-06-30 PROCEDURE — 99283 EMERGENCY DEPT VISIT LOW MDM: CPT

## 2023-06-30 PROCEDURE — 99282 EMERGENCY DEPT VISIT SF MDM: CPT
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
## 2023-06-30 NOTE — ED PROVIDER NOTE - CARE PROVIDER_API CALL
Tianna Woodall  Obstetrics and Gynecology  [address] [place] [address]  Phone: [phone]  Fax: [phone]  Follow Up Time:

## 2023-06-30 NOTE — ED PROVIDER NOTE - OBJECTIVE STATEMENT
Patient is a 37-year-old female with no past medical history status post BTL June 13  at [facility] here for wound check laparoscopic incision site.  Patient states she was doing well yesterday she accidentally hit her bellybutton area on wheelchair started developing pain and noticed discharge yesterday.  Patient denies any discharge today states the dressing fell off.  Patient denies any fever chills abdominal pain nausea vomiting diarrhea.  Patient has a scheduled for postop appointment still. Pt taking ibuprofen for pain.

## 2023-06-30 NOTE — ED ADULT NURSE NOTE - NSFALLUNIVINTERV_ED_ALL_ED
Bed/Stretcher in lowest position, wheels locked, appropriate side rails in place/Call bell, personal items and telephone in reach/Instruct patient to call for assistance before getting out of bed/chair/stretcher/Non-slip footwear applied when patient is off stretcher/[place] to call system/Physically safe environment - no spills, clutter or unnecessary equipment/Purposeful proactive rounding/Room/bathroom lighting operational, light cord in reach

## 2023-06-30 NOTE — ED PROVIDER NOTE - NSFOLLOWUPINSTRUCTIONS_ED_ALL_ED_FT
Follow up with your obgyn  return for redness drainage fever      Wound Dehiscence  Wound dehiscence is when a surgical incision breaks open and does not heal properly after surgery. It usually happens 7–10 days after surgery. This can be a serious condition. It is important to identify and treat this condition early.    What are the causes?  Common causes of this condition include:  Stretching of the wound area. This may be caused by lifting, vomiting, violent coughing, or straining during bowel movements.  Wound infection.  Early removal of stitches (sutures) or the sutures breaking or coming loose.  What increases the risk?  The following factors may make you more likely to develop this condition:  Obesity.  Lung disease.  Smoking.  Poor nutrition.  Contamination during surgery.  Medical problems that make it harder to heal, such as diabetes or autoimmune diseases.  Taking certain medicines.  What are the signs or symptoms?    Symptoms of this condition include:  Bleeding or drainage from the wound.  Pain.  Fever.  The wound starting to break open.  How is this diagnosed?  This condition may be diagnosed with a physical exam. Your health care provider will monitor the incision and note any changes in the wound. These changes can include a gap in the incision and an increase in drainage or pain. The health care provider may ask you if you have noticed any stretching or tearing of the wound.    You may also have tests, including:  Wound culture tests to determine if there is an infection.  Imaging tests, such as an MRI scan or CT scan, to determine if there is a collection of pus or fluid in the wound area.  Blood tests to check for infection and inflammation.  How is this treated?  Treatment for this condition may include:  Wound care to keep the incision clean and help it heal.  Surgical repair.  Antibiotic medicine to treat or prevent infection.  Medicines to reduce pain and swelling.  Follow these instructions at home:  Medicines    Take over-the-counter and prescription medicines only as told by your health care provider. Taking pain medicine 30 minutes before changing a bandage (dressing) can help relieve pain.  If you were prescribed an antibiotic medicine, take it as told by your health care provider. Do not stop using the antibiotic even if you start to feel better.  Wound care      Follow instructions from your health care provider about how to take care of your wound. Make sure you:  Wash your hands with soap and water before and after you change your dressing or wash the wound area. If soap and water are not available, use hand .  Gently wash the wound area with mild soap and water 2 times a day, or as directed. Rinse off the soap. Pat the area dry with a clean towel. Do not rub the wound.  Change the dressing and the packing inside as told by your health care provider.  Do not scratch or pick at the wound.  Check your wound area every day for signs of infection. Check for:  More redness, swelling, or pain.  More fluid or blood.  Warmth.  Pus or a bad smell.  Activity      Avoid exercises that make you sweat heavily or could cause stretching of your wound.  Do not lift anything that is heavier than 10 lb (4.5 kg), or the limit that you are told, until the wound is healed or until your health care provider says that it is safe.  General instructions    Do not take baths, swim, or use a hot tub until your health care provider approves. Ask your health care provider if you may take showers. You may only be allowed to take sponge baths.  Keep all follow-up visits as told by your health care provider. This is important.  Contact a health care provider if:  Your wound does not seem to be healing properly.  You have a fever.  Get help right away if:  You have more redness, swelling, or pain around your wound.  You have more fluid coming from your wound.  Your wound, or the area around it, feels hard or warm to the touch.  You have pus or a bad smell coming from your wound.  Your wound breaks open farther.  You have redness streaking or spreading from your wound.  You have excessive bleeding from your wound.  Summary  Wound dehiscence is when a surgical incision breaks open and does not heal properly after surgery.  Follow instructions from your health care provider about how to take care of your wound.  Check your wound area every day for signs of infection, such as redness, swelling, pain, fluid, blood, warmth, pus, or a bad smell.  If you were prescribed an antibiotic medicine, take it as told by your health care provider. Do not stop using the antibiotic even if you start to feel better.  This information is not intended to replace advice given to you by your health care provider. Make sure you discuss any questions you have with your health care provider.

## 2023-06-30 NOTE — ED ADULT NURSE NOTE - SKIN TURGOR
resilient/elastic Rotation Flap Text: The defect edges were debeveled with a #15 scalpel blade.  Given the location of the defect, shape of the defect and the proximity to free margins a rotation flap was deemed most appropriate.  Using a sterile surgical marker, an appropriate rotation flap was drawn incorporating the defect and placing the expected incisions within the relaxed skin tension lines where possible.    The area thus outlined was incised deep to adipose tissue with a #15 scalpel blade.  The skin margins were undermined to an appropriate distance in all directions utilizing iris scissors.

## 2023-06-30 NOTE — ED PROVIDER NOTE - NS ED ATTENDING STATEMENT MOD
Attending Only This was a shared visit with the MARIA ELENA. I reviewed and verified the documentation and independently performed the documented:

## 2023-06-30 NOTE — ED PROVIDER NOTE - PATIENT PORTAL LINK FT
You can access the FollowMyHealth Patient Portal offered by [facility] by registering at the following website: [email or web address]. By joining [employer]’s FollowMyHealth portal, you will also be able to view your health information using other applications (apps) compatible with our system.

## 2023-06-30 NOTE — ED PROVIDER NOTE - GASTROINTESTINAL, MLM
Abdomen soft, non-tender, no guarding. 0.5 cm superficial opening at 5 clock by umbilicus no drainage does not probe no redness normal warmth

## 2023-06-30 NOTE — ED ADULT TRIAGE NOTE - BSA (M2)
Not on file   Occupational History    Not on file   Social Needs    Financial resource strain: Not on file    Food insecurity     Worry: Not on file     Inability: Not on file    Transportation needs     Medical: Not on file     Non-medical: Not on file   Tobacco Use    Smoking status: Former Smoker     Types: Cigarettes     Last attempt to quit: 2004     Years since quittin.2    Smokeless tobacco: Never Used   Substance and Sexual Activity    Alcohol use: No     Alcohol/week: 0.0 standard drinks    Drug use: No    Sexual activity: Never   Lifestyle    Physical activity     Days per week: Not on file     Minutes per session: Not on file    Stress: Not on file   Relationships    Social connections     Talks on phone: Not on file     Gets together: Not on file     Attends Denominational service: Not on file     Active member of club or organization: Not on file     Attends meetings of clubs or organizations: Not on file     Relationship status: Not on file    Intimate partner violence     Fear of current or ex partner: Not on file     Emotionally abused: Not on file     Physically abused: Not on file     Forced sexual activity: Not on file   Other Topics Concern    Not on file   Social History Narrative    Not on file     Family History   Problem Relation Age of Onset    Heart Disease Mother     No Known Problems Father     No Known Problems Sister     No Known Problems Brother            Physical Exam:    Temp 98 °F (36.7 °C)   Ht 5' 7\" (1.702 m)   Wt 133 lb (60.3 kg)   BMI 20.83 kg/m²     GENERAL: alert, appears stated age, cooperative, no acute distress    HEENT: Head is normocephalic, atraumatic. PERRLA. SKIN: Clean, dry, intact. There no cellulitis or cutaneous lesions noted in the lower extremities    PULMONARY: breathing is regular and unlabored, no acute distress    CV: The bilateral upper and lower extremities are warm and well-perfused with brisk capillary refill.  2+ pulses UE intact. There is a trace amount joint fluid. There is no Baker's cyst. The fat pads are preserved in signal. There is no evidence of fracture or osteonecrosis. 1. Slight increased signal within the posterior horn of the medial meniscus, which does not meet criteria for a tear. 2. Ligaments are intact. Wiley Roth was seen today for leg pain. Diagnoses and all orders for this visit:    Tear of medial meniscus of left knee, current, unspecified tear type, initial encounter  -     MRI KNEE LEFT WO CONTRAST; Future    Left hip pain  -     XR HIP LEFT (2-3 VIEWS); Future         Patient seen examined. X-rays reviewed. Mild to no arthritic changes on x-ray. Patient complains of mechanical instability. MRI reviewed with patient in detail. He has more patellofemoral irritation as well as possible meniscal contusion. Treatment options were discussed with patient and detail including surgery versus nonoperative management. Despite being limited in activities of daily living and mobilization, patient continues to do as much as he can throughout the day despite being impaired. Patient was once again somewhat vague in his description of knee joint pain. Patient also cannot describe if his knee joint feels unstable but he does feel just pain. Despite this available imaging reveals only minor arthritic changes and patient was educated about total knee replacement and the likelihood of resolution of pain. At the same time, patient is complaining of limb length discrepancy. Patient was educated that the most likely cause of this is hip arthritis of the ipsilateral leg. Patient somewhat was adamant against this idea since he has no hip pain and would not want a hip replacement regardless. Patient feels that his limb length discrepancy stems from his knee pain.   Patient was recommended to have repeat MRI performed since his imaging is from over 2 years ago as well as imaging of his hip to evaluate shortening. The risks and benefits of a knee arthroscopy were discussed with the patient. The risks are including but not limited to: infection, injuries to blood vessels and nerves, non relief of symptoms, arthrofibrosis of knee, need for further operative intervention, blood loss, PE/DVT, MI and death. Patient verbalized understanding of the discussed procedure along with risks and benefits. Lolita Cabrera DO             25 minutes was spent with patient. 50% or greater was spent counseling the patient. 2.17

## 2023-06-30 NOTE — ED ADULT NURSE NOTE - OBJECTIVE STATEMENT
Patient is 38yo F presents with c/o needing wound check s/p tubal ligation at [facility] on 6/13. Patient with small wound in umbilical area, patient with protuberant belly button, reports this is normal for her. Patient denies pain, drainage, fever, chills.

## 2023-06-30 NOTE — ED ADULT NURSE NOTE - CHIEF COMPLAINT QUOTE
Pt had tubal litigation 6/13, states the site sutures are opening at the midline site, denies n/v/d, denies drainage or bleeding, denies pain, not taking analgesic or ABT

## 2023-08-08 NOTE — H&P PST ADULT - NSANTHNECKRD_ENT_A_CORE
Call placed regarding one month post discharge follow up call. At time of outreach call, the patient feels as if his condition has returned to baseline since hospitalization. He states as long as he keeps up with his inhalers he feels good. No questions or concerns for Dr. Lees at this time.   
No

## 2023-10-31 ENCOUNTER — EMERGENCY (EMERGENCY)
Facility: HOSPITAL | Age: 38
LOS: 1 days | Discharge: HOME | End: 2023-10-31
Attending: EMERGENCY MEDICINE
Payer: MEDICAID

## 2023-10-31 VITALS
WEIGHT: 214.95 LBS | RESPIRATION RATE: 19 BRPM | DIASTOLIC BLOOD PRESSURE: 100 MMHG | TEMPERATURE: 98 F | SYSTOLIC BLOOD PRESSURE: 145 MMHG | OXYGEN SATURATION: 100 % | HEART RATE: 80 BPM

## 2023-10-31 LAB
ALBUMIN SERPL ELPH-MCNC: 4.8 G/DL — SIGNIFICANT CHANGE UP (ref 3.3–5.2)
ALBUMIN SERPL ELPH-MCNC: 4.8 G/DL — SIGNIFICANT CHANGE UP (ref 3.3–5.2)
ALP SERPL-CCNC: 116 U/L — SIGNIFICANT CHANGE UP (ref 40–120)
ALP SERPL-CCNC: 116 U/L — SIGNIFICANT CHANGE UP (ref 40–120)
ALT FLD-CCNC: 15 U/L — SIGNIFICANT CHANGE UP
ALT FLD-CCNC: 15 U/L — SIGNIFICANT CHANGE UP
ANION GAP SERPL CALC-SCNC: 16 MMOL/L — SIGNIFICANT CHANGE UP (ref 5–17)
ANION GAP SERPL CALC-SCNC: 16 MMOL/L — SIGNIFICANT CHANGE UP (ref 5–17)
APPEARANCE UR: CLEAR — SIGNIFICANT CHANGE UP
APPEARANCE UR: CLEAR — SIGNIFICANT CHANGE UP
AST SERPL-CCNC: 17 U/L — SIGNIFICANT CHANGE UP
AST SERPL-CCNC: 17 U/L — SIGNIFICANT CHANGE UP
BACTERIA # UR AUTO: ABNORMAL
BACTERIA # UR AUTO: ABNORMAL
BASOPHILS # BLD AUTO: 0.06 K/UL — SIGNIFICANT CHANGE UP (ref 0–0.2)
BASOPHILS # BLD AUTO: 0.06 K/UL — SIGNIFICANT CHANGE UP (ref 0–0.2)
BASOPHILS NFR BLD AUTO: 0.3 % — SIGNIFICANT CHANGE UP (ref 0–2)
BASOPHILS NFR BLD AUTO: 0.3 % — SIGNIFICANT CHANGE UP (ref 0–2)
BILIRUB SERPL-MCNC: 0.3 MG/DL — LOW (ref 0.4–2)
BILIRUB SERPL-MCNC: 0.3 MG/DL — LOW (ref 0.4–2)
BILIRUB UR-MCNC: NEGATIVE — SIGNIFICANT CHANGE UP
BILIRUB UR-MCNC: NEGATIVE — SIGNIFICANT CHANGE UP
BUN SERPL-MCNC: 11.9 MG/DL — SIGNIFICANT CHANGE UP (ref 8–20)
BUN SERPL-MCNC: 11.9 MG/DL — SIGNIFICANT CHANGE UP (ref 8–20)
CALCIUM SERPL-MCNC: 9.1 MG/DL — SIGNIFICANT CHANGE UP (ref 8.4–10.5)
CALCIUM SERPL-MCNC: 9.1 MG/DL — SIGNIFICANT CHANGE UP (ref 8.4–10.5)
CHLORIDE SERPL-SCNC: 99 MMOL/L — SIGNIFICANT CHANGE UP (ref 96–108)
CHLORIDE SERPL-SCNC: 99 MMOL/L — SIGNIFICANT CHANGE UP (ref 96–108)
CO2 SERPL-SCNC: 22 MMOL/L — SIGNIFICANT CHANGE UP (ref 22–29)
CO2 SERPL-SCNC: 22 MMOL/L — SIGNIFICANT CHANGE UP (ref 22–29)
COLOR SPEC: YELLOW — SIGNIFICANT CHANGE UP
COLOR SPEC: YELLOW — SIGNIFICANT CHANGE UP
CREAT SERPL-MCNC: 0.82 MG/DL — SIGNIFICANT CHANGE UP (ref 0.5–1.3)
CREAT SERPL-MCNC: 0.82 MG/DL — SIGNIFICANT CHANGE UP (ref 0.5–1.3)
DIFF PNL FLD: ABNORMAL
DIFF PNL FLD: ABNORMAL
EGFR: 94 ML/MIN/1.73M2 — SIGNIFICANT CHANGE UP
EGFR: 94 ML/MIN/1.73M2 — SIGNIFICANT CHANGE UP
EOSINOPHIL # BLD AUTO: 0.1 K/UL — SIGNIFICANT CHANGE UP (ref 0–0.5)
EOSINOPHIL # BLD AUTO: 0.1 K/UL — SIGNIFICANT CHANGE UP (ref 0–0.5)
EOSINOPHIL NFR BLD AUTO: 0.5 % — SIGNIFICANT CHANGE UP (ref 0–6)
EOSINOPHIL NFR BLD AUTO: 0.5 % — SIGNIFICANT CHANGE UP (ref 0–6)
EPI CELLS # UR: SIGNIFICANT CHANGE UP
EPI CELLS # UR: SIGNIFICANT CHANGE UP
GLUCOSE SERPL-MCNC: 104 MG/DL — HIGH (ref 70–99)
GLUCOSE SERPL-MCNC: 104 MG/DL — HIGH (ref 70–99)
GLUCOSE UR QL: NEGATIVE MG/DL — SIGNIFICANT CHANGE UP
GLUCOSE UR QL: NEGATIVE MG/DL — SIGNIFICANT CHANGE UP
HCG SERPL-ACNC: <4 MIU/ML — SIGNIFICANT CHANGE UP
HCG SERPL-ACNC: <4 MIU/ML — SIGNIFICANT CHANGE UP
HCT VFR BLD CALC: 38.2 % — SIGNIFICANT CHANGE UP (ref 34.5–45)
HCT VFR BLD CALC: 38.2 % — SIGNIFICANT CHANGE UP (ref 34.5–45)
HGB BLD-MCNC: 12.3 G/DL — SIGNIFICANT CHANGE UP (ref 11.5–15.5)
HGB BLD-MCNC: 12.3 G/DL — SIGNIFICANT CHANGE UP (ref 11.5–15.5)
IMM GRANULOCYTES NFR BLD AUTO: 0.5 % — SIGNIFICANT CHANGE UP (ref 0–0.9)
IMM GRANULOCYTES NFR BLD AUTO: 0.5 % — SIGNIFICANT CHANGE UP (ref 0–0.9)
KETONES UR-MCNC: NEGATIVE — SIGNIFICANT CHANGE UP
KETONES UR-MCNC: NEGATIVE — SIGNIFICANT CHANGE UP
LEUKOCYTE ESTERASE UR-ACNC: NEGATIVE — SIGNIFICANT CHANGE UP
LEUKOCYTE ESTERASE UR-ACNC: NEGATIVE — SIGNIFICANT CHANGE UP
LIDOCAIN IGE QN: 601 U/L — HIGH (ref 22–51)
LIDOCAIN IGE QN: 601 U/L — HIGH (ref 22–51)
LYMPHOCYTES # BLD AUTO: 1.91 K/UL — SIGNIFICANT CHANGE UP (ref 1–3.3)
LYMPHOCYTES # BLD AUTO: 1.91 K/UL — SIGNIFICANT CHANGE UP (ref 1–3.3)
LYMPHOCYTES # BLD AUTO: 10.5 % — LOW (ref 13–44)
LYMPHOCYTES # BLD AUTO: 10.5 % — LOW (ref 13–44)
MCHC RBC-ENTMCNC: 26.7 PG — LOW (ref 27–34)
MCHC RBC-ENTMCNC: 26.7 PG — LOW (ref 27–34)
MCHC RBC-ENTMCNC: 32.2 GM/DL — SIGNIFICANT CHANGE UP (ref 32–36)
MCHC RBC-ENTMCNC: 32.2 GM/DL — SIGNIFICANT CHANGE UP (ref 32–36)
MCV RBC AUTO: 82.9 FL — SIGNIFICANT CHANGE UP (ref 80–100)
MCV RBC AUTO: 82.9 FL — SIGNIFICANT CHANGE UP (ref 80–100)
MONOCYTES # BLD AUTO: 0.55 K/UL — SIGNIFICANT CHANGE UP (ref 0–0.9)
MONOCYTES # BLD AUTO: 0.55 K/UL — SIGNIFICANT CHANGE UP (ref 0–0.9)
MONOCYTES NFR BLD AUTO: 3 % — SIGNIFICANT CHANGE UP (ref 2–14)
MONOCYTES NFR BLD AUTO: 3 % — SIGNIFICANT CHANGE UP (ref 2–14)
NEUTROPHILS # BLD AUTO: 15.49 K/UL — HIGH (ref 1.8–7.4)
NEUTROPHILS # BLD AUTO: 15.49 K/UL — HIGH (ref 1.8–7.4)
NEUTROPHILS NFR BLD AUTO: 85.2 % — HIGH (ref 43–77)
NEUTROPHILS NFR BLD AUTO: 85.2 % — HIGH (ref 43–77)
NITRITE UR-MCNC: NEGATIVE — SIGNIFICANT CHANGE UP
NITRITE UR-MCNC: NEGATIVE — SIGNIFICANT CHANGE UP
PH UR: 8 — SIGNIFICANT CHANGE UP (ref 5–8)
PH UR: 8 — SIGNIFICANT CHANGE UP (ref 5–8)
PLATELET # BLD AUTO: 413 K/UL — HIGH (ref 150–400)
PLATELET # BLD AUTO: 413 K/UL — HIGH (ref 150–400)
POTASSIUM SERPL-MCNC: 3.9 MMOL/L — SIGNIFICANT CHANGE UP (ref 3.5–5.3)
POTASSIUM SERPL-MCNC: 3.9 MMOL/L — SIGNIFICANT CHANGE UP (ref 3.5–5.3)
POTASSIUM SERPL-SCNC: 3.9 MMOL/L — SIGNIFICANT CHANGE UP (ref 3.5–5.3)
POTASSIUM SERPL-SCNC: 3.9 MMOL/L — SIGNIFICANT CHANGE UP (ref 3.5–5.3)
PROT SERPL-MCNC: 8.5 G/DL — SIGNIFICANT CHANGE UP (ref 6.6–8.7)
PROT SERPL-MCNC: 8.5 G/DL — SIGNIFICANT CHANGE UP (ref 6.6–8.7)
PROT UR-MCNC: 30 MG/DL
PROT UR-MCNC: 30 MG/DL
RBC # BLD: 4.61 M/UL — SIGNIFICANT CHANGE UP (ref 3.8–5.2)
RBC # BLD: 4.61 M/UL — SIGNIFICANT CHANGE UP (ref 3.8–5.2)
RBC # FLD: 15.4 % — HIGH (ref 10.3–14.5)
RBC # FLD: 15.4 % — HIGH (ref 10.3–14.5)
RBC CASTS # UR COMP ASSIST: SIGNIFICANT CHANGE UP /HPF (ref 0–4)
RBC CASTS # UR COMP ASSIST: SIGNIFICANT CHANGE UP /HPF (ref 0–4)
SODIUM SERPL-SCNC: 137 MMOL/L — SIGNIFICANT CHANGE UP (ref 135–145)
SODIUM SERPL-SCNC: 137 MMOL/L — SIGNIFICANT CHANGE UP (ref 135–145)
SP GR SPEC: 1.01 — SIGNIFICANT CHANGE UP (ref 1.01–1.02)
SP GR SPEC: 1.01 — SIGNIFICANT CHANGE UP (ref 1.01–1.02)
UROBILINOGEN FLD QL: NEGATIVE MG/DL — SIGNIFICANT CHANGE UP
UROBILINOGEN FLD QL: NEGATIVE MG/DL — SIGNIFICANT CHANGE UP
WBC # BLD: 18.21 K/UL — HIGH (ref 3.8–10.5)
WBC # BLD: 18.21 K/UL — HIGH (ref 3.8–10.5)
WBC # FLD AUTO: 18.21 K/UL — HIGH (ref 3.8–10.5)
WBC # FLD AUTO: 18.21 K/UL — HIGH (ref 3.8–10.5)
WBC UR QL: SIGNIFICANT CHANGE UP /HPF (ref 0–5)
WBC UR QL: SIGNIFICANT CHANGE UP /HPF (ref 0–5)

## 2023-10-31 PROCEDURE — 74177 CT ABD & PELVIS W/CONTRAST: CPT | Mod: 26,MA

## 2023-10-31 PROCEDURE — 74177 CT ABD & PELVIS W/CONTRAST: CPT | Mod: MA

## 2023-10-31 PROCEDURE — 83690 ASSAY OF LIPASE: CPT

## 2023-10-31 PROCEDURE — 96374 THER/PROPH/DIAG INJ IV PUSH: CPT | Mod: XU

## 2023-10-31 PROCEDURE — 85025 COMPLETE CBC W/AUTO DIFF WBC: CPT

## 2023-10-31 PROCEDURE — 99285 EMERGENCY DEPT VISIT HI MDM: CPT | Mod: 25

## 2023-10-31 PROCEDURE — 99285 EMERGENCY DEPT VISIT HI MDM: CPT

## 2023-10-31 PROCEDURE — 93005 ELECTROCARDIOGRAM TRACING: CPT

## 2023-10-31 PROCEDURE — 84702 CHORIONIC GONADOTROPIN TEST: CPT

## 2023-10-31 PROCEDURE — 80053 COMPREHEN METABOLIC PANEL: CPT

## 2023-10-31 PROCEDURE — 81001 URINALYSIS AUTO W/SCOPE: CPT

## 2023-10-31 PROCEDURE — 36415 COLL VENOUS BLD VENIPUNCTURE: CPT

## 2023-10-31 PROCEDURE — 93010 ELECTROCARDIOGRAM REPORT: CPT

## 2023-10-31 PROCEDURE — 96375 TX/PRO/DX INJ NEW DRUG ADDON: CPT

## 2023-10-31 PROCEDURE — 87086 URINE CULTURE/COLONY COUNT: CPT

## 2023-10-31 PROCEDURE — 96361 HYDRATE IV INFUSION ADD-ON: CPT

## 2023-10-31 RX ORDER — ONDANSETRON 8 MG/1
1 TABLET, FILM COATED ORAL
Qty: 8 | Refills: 0
Start: 2023-10-31 | End: 2023-11-01

## 2023-10-31 RX ORDER — SODIUM CHLORIDE 9 MG/ML
1000 INJECTION, SOLUTION INTRAVENOUS ONCE
Refills: 0 | Status: COMPLETED | OUTPATIENT
Start: 2023-10-31 | End: 2023-10-31

## 2023-10-31 RX ORDER — IBUPROFEN 200 MG
1 TABLET ORAL
Qty: 20 | Refills: 0
Start: 2023-10-31 | End: 2023-11-04

## 2023-10-31 RX ORDER — KETOROLAC TROMETHAMINE 30 MG/ML
15 SYRINGE (ML) INJECTION ONCE
Refills: 0 | Status: DISCONTINUED | OUTPATIENT
Start: 2023-10-31 | End: 2023-10-31

## 2023-10-31 RX ORDER — ONDANSETRON 8 MG/1
4 TABLET, FILM COATED ORAL ONCE
Refills: 0 | Status: COMPLETED | OUTPATIENT
Start: 2023-10-31 | End: 2023-10-31

## 2023-10-31 RX ADMIN — ONDANSETRON 4 MILLIGRAM(S): 8 TABLET, FILM COATED ORAL at 17:02

## 2023-10-31 RX ADMIN — SODIUM CHLORIDE 1000 MILLILITER(S): 9 INJECTION, SOLUTION INTRAVENOUS at 18:00

## 2023-10-31 RX ADMIN — SODIUM CHLORIDE 1000 MILLILITER(S): 9 INJECTION, SOLUTION INTRAVENOUS at 20:53

## 2023-10-31 RX ADMIN — SODIUM CHLORIDE 1000 MILLILITER(S): 9 INJECTION, SOLUTION INTRAVENOUS at 17:00

## 2023-10-31 RX ADMIN — Medication 15 MILLIGRAM(S): at 17:00

## 2023-10-31 RX ADMIN — Medication 15 MILLIGRAM(S): at 17:35

## 2023-10-31 RX ADMIN — SODIUM CHLORIDE 1000 MILLILITER(S): 9 INJECTION, SOLUTION INTRAVENOUS at 19:53

## 2023-10-31 NOTE — ED ADULT TRIAGE NOTE - CHIEF COMPLAINT QUOTE
Pt arrives to ED c/o LLQ abdominal pain, nausea, vomiting and intermittent palpitations after starting ciproflaxin 2 days ago for suspected colitis.  Pt denies PMH  Pt states abdominal pain has been persistent x 1 month

## 2023-10-31 NOTE — ED PROVIDER NOTE - PATIENT PORTAL LINK FT
You can access the FollowMyHealth Patient Portal offered by [facility] by registering at the following website: [email or web address]. By joining [employer]’s FollowMyHealth portal, you will also be able to view your health information using other applications (apps) compatible with our system.

## 2023-10-31 NOTE — ED PROVIDER NOTE - CARE PROVIDER_API CALL
Vaishali Yeager  Gastroenterology  [address] [facility], [address] [address]  Phone: [phone]  Fax: [phone]  Follow Up Time:

## 2023-10-31 NOTE — ED PROVIDER NOTE - ADDITIONAL NOTES AND INSTRUCTIONS:
PT was evaluated At [facility] ED and was found to have a condition that warranted time of to rest and heal from WORK/SCHOOL.   Mohan Callejas PA-C

## 2023-10-31 NOTE — ED PROVIDER NOTE - CLINICAL SUMMARY MEDICAL DECISION MAKING FREE TEXT BOX
PT with no SPMHx presents to the ED with complaint of abd pain N/V that started last night. Pt states that she had a gradual onset of symptoms after taking ABx (cipro, flagyl) that were Rx to her by her PCP after she had complained of intermit LLQ pain over the last 4 wk. Pt states that abd pain is mild but she has been unable to tolerate both solids and liquids. Pt dines fever, chills, weakness, HA, dizziness, SOB, diff breathing, change in bowel habits. On exam pt actively vomiting mild LLQ ttp, no rebound no guarding. Pt found to have elevated lipase no other findings. Pt offered admission Vs obs that she differed at this time, Pt with Dena criteria of 1 indicating low mortality rate, pt able to tolerate po in the ED, pt to be dc home with antiemetics, low threshold for return to the ED, follow up to PCP, GI, pt educated about when to return to the ED if needed. PT verbalizes that he understands all instructions and results. Pt informed that ED is open and available 24/7 365 days a yr, encouraged to return to the ED if they have any change in condition, or feel the need for revaluation.

## 2023-10-31 NOTE — ED PROVIDER NOTE - NSFOLLOWUPINSTRUCTIONS_ED_ALL_ED_FT
Patient education: Acute pancreatitis (The Basics)  Written by the doctors and editors at [facility]  Please read the Disclaimer at the end of this page.    What is pancreatitis?  Pancreatitis is a condition that can cause severe belly pain.    The pancreas is an organ that makes hormones and juices that help break down food (figure 1). Pancreatitis is the term for when this organ gets irritated or swollen. "Acute" pancreatitis is when there is a sudden episode of pain.    Most people heal from pancreatitis without any long-lasting effects. But a few people get very sick. "Chronic" pancreatitis is when the pancreas gets damaged by irritation over time.    What causes acute pancreatitis?  There are many causes of pancreatitis. But most cases are caused by gallstones or alcohol misuse:    ?Gallstones – Gallstones are hard lumps that form inside an organ called the gallbladder. Both the pancreas and the gallbladder drain into a single tube. If that tube gets clogged by a gallstone, neither of the organs can drain. When that happens, the fluids from both organs get backed up. That can cause pain.    ?Alcohol misuse – People who drink too much alcohol for too long sometimes get alcohol-related pancreatitis. People with this form of pancreatitis usually start to feel pain 1 to 3 days after drinking a lot of alcohol or after they suddenly stop drinking. They usually also have nausea and vomiting.    What are the symptoms of acute pancreatitis?  The main symptom is pain in the upper belly that starts quickly. In about half of people, the pain spreads to the back. Some people have more pain after a meal. Pain might be slightly reduced by sitting up or bending forward. The pain might come with nausea or vomiting.    Is there a test for acute pancreatitis?  There are a few blood tests that can help your doctor or nurse figure out if you have pancreatitis. Your doctor might also order a special kind of X-ray called a "CT scan" of your belly. This is to check if your belly pain is due to pancreatitis or another condition.    How is acute pancreatitis treated?  Acute pancreatitis is usually treated in the hospital. There, your doctor or nurse can give you fluids and pain medicines to help you feel better. If you cannot eat, they can give you food through a tube.    Some people with pancreatitis get an infection, which can be treated with antibiotics. Pancreatitis can also lead to other problems, such as fluid buildup around the pancreas or organ failure. Fluid buildup around pancreas often goes away on its own, but sometimes needs to be drained or treated with surgery. Organ failure is usually handled by a team of doctors in the intensive care unit, or "ICU."    Another important part of treatment is to get rid of the cause of the pancreatitis. If your pancreatitis is caused by gallstones, your doctor might need to treat them, too. People with pancreatitis from alcohol use must learn to stop drinking alcohol to keep from getting pancreatitis again.

## 2023-10-31 NOTE — ED ADULT NURSE NOTE - NSFALLUNIVINTERV_ED_ALL_ED
Bed/Stretcher in lowest position, wheels locked, appropriate side rails in place/Call bell, personal items and telephone in reach/Instruct patient to call for assistance before getting out of bed/chair/stretcher/Non-slip footwear applied when patient is off stretcher/[place] to call system/Physically safe environment - no spills, clutter or unnecessary equipment/Purposeful proactive rounding/Room/bathroom lighting operational, light cord in reach

## 2023-10-31 NOTE — ED PROVIDER NOTE - ATTENDING APP SHARED VISIT CONTRIBUTION OF CARE
Pt with LLQ abd pain  MD gave her cipro after obgyn did not find gyn etiology of pain, but cipro made her vomit  pe as documented  agree w pe  agree w labs iv ivf  iv  meds imaging  agree w ekaterina and fazal

## 2023-10-31 NOTE — ED ADULT NURSE NOTE - OBJECTIVE STATEMENT
Pt is here with c/o LLQ pain with N/V since taking Cipro 2 days ago for possible colitis.  Pt denies fever, denies diarrhea.  Pt was being treated by her PCP for abd pain x 1 month.  #[id number] inserted to LAC, medicated for pain and nausea as ordered.

## 2023-11-02 LAB
CULTURE RESULTS: NO GROWTH — SIGNIFICANT CHANGE UP
CULTURE RESULTS: NO GROWTH — SIGNIFICANT CHANGE UP
SPECIMEN SOURCE: SIGNIFICANT CHANGE UP
SPECIMEN SOURCE: SIGNIFICANT CHANGE UP

## 2023-11-08 ENCOUNTER — NON-APPOINTMENT (OUTPATIENT)
Age: 38
End: 2023-11-08

## 2023-11-15 ENCOUNTER — APPOINTMENT (OUTPATIENT)
Dept: GASTROENTEROLOGY | Facility: CLINIC | Age: 38
End: 2023-11-15
Payer: MEDICAID

## 2023-11-15 VITALS
OXYGEN SATURATION: 98 % | DIASTOLIC BLOOD PRESSURE: 90 MMHG | WEIGHT: 213 LBS | BODY MASS INDEX: 31.55 KG/M2 | SYSTOLIC BLOOD PRESSURE: 126 MMHG | HEIGHT: 69 IN | RESPIRATION RATE: 16 BRPM | HEART RATE: 74 BPM

## 2023-11-15 DIAGNOSIS — R10.32 LEFT LOWER QUADRANT PAIN: ICD-10-CM

## 2023-11-15 DIAGNOSIS — R74.8 ABNORMAL LEVELS OF OTHER SERUM ENZYMES: ICD-10-CM

## 2023-11-15 DIAGNOSIS — Z87.891 PERSONAL HISTORY OF NICOTINE DEPENDENCE: ICD-10-CM

## 2023-11-15 DIAGNOSIS — Z78.9 OTHER SPECIFIED HEALTH STATUS: ICD-10-CM

## 2023-11-15 DIAGNOSIS — Z76.89 PERSONS ENCOUNTERING HEALTH SERVICES IN OTHER SPECIFIED CIRCUMSTANCES: ICD-10-CM

## 2023-11-15 DIAGNOSIS — K85.90 ACUTE PANCREATITIS WITHOUT NECROSIS OR INFECTION, UNSPECIFIED: ICD-10-CM

## 2023-11-15 DIAGNOSIS — K59.00 CONSTIPATION, UNSPECIFIED: ICD-10-CM

## 2023-11-15 PROCEDURE — 99203 OFFICE O/P NEW LOW 30 MIN: CPT

## 2023-12-17 ENCOUNTER — EMERGENCY (EMERGENCY)
Facility: HOSPITAL | Age: 38
LOS: 1 days | Discharge: HOME | End: 2023-12-17
Attending: EMERGENCY MEDICINE
Payer: MEDICAID

## 2023-12-17 VITALS
HEART RATE: 87 BPM | DIASTOLIC BLOOD PRESSURE: 85 MMHG | OXYGEN SATURATION: 98 % | HEIGHT: 69 IN | TEMPERATURE: 99 F | SYSTOLIC BLOOD PRESSURE: 147 MMHG | WEIGHT: 209.88 LBS | RESPIRATION RATE: 20 BRPM

## 2023-12-17 LAB
B PERT DNA SPEC QL NAA+PROBE: SIGNIFICANT CHANGE UP
B PERT DNA SPEC QL NAA+PROBE: SIGNIFICANT CHANGE UP
C PNEUM DNA SPEC QL NAA+PROBE: SIGNIFICANT CHANGE UP
C PNEUM DNA SPEC QL NAA+PROBE: SIGNIFICANT CHANGE UP
FLUAV H1 2009 PAND RNA SPEC QL NAA+PROBE: SIGNIFICANT CHANGE UP
FLUAV H1 2009 PAND RNA SPEC QL NAA+PROBE: SIGNIFICANT CHANGE UP
FLUAV H1 RNA SPEC QL NAA+PROBE: SIGNIFICANT CHANGE UP
FLUAV H1 RNA SPEC QL NAA+PROBE: SIGNIFICANT CHANGE UP
FLUAV H3 RNA SPEC QL NAA+PROBE: SIGNIFICANT CHANGE UP
FLUAV H3 RNA SPEC QL NAA+PROBE: SIGNIFICANT CHANGE UP
FLUAV SUBTYP SPEC NAA+PROBE: SIGNIFICANT CHANGE UP
FLUAV SUBTYP SPEC NAA+PROBE: SIGNIFICANT CHANGE UP
FLUBV RNA SPEC QL NAA+PROBE: SIGNIFICANT CHANGE UP
FLUBV RNA SPEC QL NAA+PROBE: SIGNIFICANT CHANGE UP
HADV DNA SPEC QL NAA+PROBE: SIGNIFICANT CHANGE UP
HADV DNA SPEC QL NAA+PROBE: SIGNIFICANT CHANGE UP
HCOV PNL SPEC NAA+PROBE: SIGNIFICANT CHANGE UP
HCOV PNL SPEC NAA+PROBE: SIGNIFICANT CHANGE UP
HMPV RNA SPEC QL NAA+PROBE: SIGNIFICANT CHANGE UP
HMPV RNA SPEC QL NAA+PROBE: SIGNIFICANT CHANGE UP
HPIV1 RNA SPEC QL NAA+PROBE: SIGNIFICANT CHANGE UP
HPIV1 RNA SPEC QL NAA+PROBE: SIGNIFICANT CHANGE UP
HPIV2 RNA SPEC QL NAA+PROBE: SIGNIFICANT CHANGE UP
HPIV2 RNA SPEC QL NAA+PROBE: SIGNIFICANT CHANGE UP
HPIV3 RNA SPEC QL NAA+PROBE: SIGNIFICANT CHANGE UP
HPIV3 RNA SPEC QL NAA+PROBE: SIGNIFICANT CHANGE UP
HPIV4 RNA SPEC QL NAA+PROBE: SIGNIFICANT CHANGE UP
HPIV4 RNA SPEC QL NAA+PROBE: SIGNIFICANT CHANGE UP
RAPID RVP RESULT: DETECTED
RAPID RVP RESULT: DETECTED
RV+EV RNA SPEC QL NAA+PROBE: SIGNIFICANT CHANGE UP
RV+EV RNA SPEC QL NAA+PROBE: SIGNIFICANT CHANGE UP
SARS-COV-2 RNA SPEC QL NAA+PROBE: DETECTED
SARS-COV-2 RNA SPEC QL NAA+PROBE: DETECTED

## 2023-12-17 PROCEDURE — 93010 ELECTROCARDIOGRAM REPORT: CPT

## 2023-12-17 PROCEDURE — 0225U NFCT DS DNA&RNA 21 SARSCOV2: CPT

## 2023-12-17 PROCEDURE — 71046 X-RAY EXAM CHEST 2 VIEWS: CPT | Mod: 26

## 2023-12-17 PROCEDURE — 71046 X-RAY EXAM CHEST 2 VIEWS: CPT

## 2023-12-17 PROCEDURE — 99284 EMERGENCY DEPT VISIT MOD MDM: CPT

## 2023-12-17 PROCEDURE — 93005 ELECTROCARDIOGRAM TRACING: CPT

## 2023-12-17 PROCEDURE — 99285 EMERGENCY DEPT VISIT HI MDM: CPT | Mod: 25

## 2023-12-17 RX ORDER — ALBUTEROL 90 UG/1
2 AEROSOL, METERED ORAL ONCE
Refills: 0 | Status: DISCONTINUED | OUTPATIENT
Start: 2023-12-17 | End: 2023-12-17

## 2023-12-17 RX ORDER — DEXAMETHASONE 0.5 MG/5ML
2 ELIXIR ORAL
Qty: 2 | Refills: 0
Start: 2023-12-17 | End: 2023-12-17

## 2023-12-17 RX ORDER — DEXAMETHASONE 0.5 MG/5ML
8 ELIXIR ORAL ONCE
Refills: 0 | Status: DISCONTINUED | OUTPATIENT
Start: 2023-12-17 | End: 2023-12-17

## 2023-12-17 RX ORDER — ALBUTEROL 90 UG/1
2 AEROSOL, METERED ORAL
Qty: 1 | Refills: 0
Start: 2023-12-17 | End: 2023-12-21

## 2023-12-17 NOTE — ED ADULT NURSE NOTE - CAS ELECT INFOMATION PROVIDED
Patient discharged by provider YE Patton prior to RN assessment. No signs of acute distress noted, respirations even and unlabored. Refer to provider notes./DC instructions

## 2023-12-17 NOTE — ED PROVIDER NOTE - ATTENDING CONTRIBUTION TO CARE
Charley NOELOO-53-cfnh-old female presents with cough and congestion for 4 days and feels like she has a chest tightness no fever chills nausea vomiting dysuria, shortness of breath.  Patient is a former smoker no recent sick contact or travel    Patient is well-appearing female, bilateral moderate aeration but no wheezing, equal air entry, S1-S2 normal regular, abdomen is soft nontender nondistended, neuro exam alert oriented x 3 no focal deficits, skin warm dry good turgor    Patient likely has bronchospasm we will check EKG chest x-ray patient denies pregnancy chest x-ray showed bronchitis type pattern we will check RVP and treat her symptomatically with albuterol and Decadron. Charley NOELIN-1-nnns-old female presents with cough and congestion for 4 days and feels like she has a chest tightness no fever chills nausea vomiting dysuria, shortness of breath.  Patient is a former smoker no recent sick contact or travel    Patient is well-appearing female, bilateral moderate aeration but no wheezing, equal air entry, S1-S2 normal regular, abdomen is soft nontender nondistended, neuro exam alert oriented x 3 no focal deficits, skin warm dry good turgor    Patient likely has bronchospasm we will check EKG chest x-ray patient denies pregnancy chest x-ray showed bronchitis type pattern we will check RVP and treat her symptomatically with albuterol and Decadron.

## 2023-12-17 NOTE — ED PROVIDER NOTE - PATIENT PORTAL LINK FT
You can access the FollowMyHealth Patient Portal offered by [facility] by registering at the following website: [email or web address]. By joining [employer]’s FollowMyHealth portal, you will also be able to view your health information using other applications (apps) compatible with our system. You can access the FollowMyHealth Patient Portal offered by [facility] by registering at the following website: [email or web address]. By joining [employer]’s FollowMyHealth portal, you will also be able to view your health information using other applications (apps) compatible with our system.

## 2023-12-17 NOTE — ED PROVIDER NOTE - CLINICAL SUMMARY MEDICAL DECISION MAKING FREE TEXT BOX
38F no med hx presenting to the ED c/o cough/congestion, h/a, c/p with coughing and deep inspiration x 4 days. CXR reviewed- wnl. RVP pending. Pt stable for d/c with supportive care recommended and pcp f/u.

## 2023-12-17 NOTE — ED PROVIDER NOTE - OBJECTIVE STATEMENT
38F no med hx presenting to the ED c/o cough/congestion, h/a, c/p with coughing and deep inspiration x 4 days. Pt states that she has a h/o of pna and symptoms are similar. Pt otherwise denies fever/chills, sob, abd pain, n/v/c/d, dysuria, numbness/tingling/weakness and has no other complaints at this time.

## 2023-12-17 NOTE — ED PROVIDER NOTE - NS ED ATTENDING STATEMENT MOD
Attending with I have seen and examined this patient and fully participated in the care of this patient as the teaching attending.  The service was shared with the MARIA ELENA.  I reviewed and verified the documentation and independently performed the documented:

## 2023-12-17 NOTE — ED ADULT NURSE REASSESSMENT NOTE - NS ED NURSE REASSESS COMMENT FT1
Patient discharged by provider YE Patton prior to RN assessment. No signs of acute distress noted, respirations even and unlabored. Refer to provider notes.

## 2023-12-17 NOTE — ED ADULT TRIAGE NOTE - HEART RATE (BEATS/MIN)
I concur with the Admission Order and I certify that services are provided in accordance with Section 42 CFR § 412.3
87

## 2023-12-17 NOTE — ED ADULT TRIAGE NOTE - CHIEF COMPLAINT QUOTE
Patient presents to ED with c/o chest pain, cough, headache, and nasal congestion since Thursday.  Per patient, chest pain is worse on inspiration.  Took Nyquil at 2000 last night.  EKG done in triage

## 2024-03-13 NOTE — ED PROVIDER NOTE - NS ED ATTENDING STATEMENT MOD
Subjective: the doctor wants my weight to be less than 150lbs   Falls since last visit No(if yes complete the Fall Tracking Form and include bsrifallreport):   Caregiver involvement changes: no  Home health supplies by type and quantity ordered/delivered this visit include: N/A    Clinician asked if patient has had any physician contact since last home care visit and patient states: NO  Clinician asked if patient has any new or changed medications and patient states:  NO   If Yes, were medications reconciled? NO   Was the certifying physician notified of changes in medications? NO     Clinical assessment (what this visit means for the patient overall and need for ongoing skilled care) and progress or lack of progress towards SPECIFIC goals: pt with a right subclavian central line for an inotrope drip. SN is still needed for weekly dressing changes and lab draws.     Written Teaching Material Utilized: N/A    Interdisciplinary communication with: N/A f    Discharge planning as follows: Is no longer homebound, Per physician order and When goals are met    Specific plan for next visit: dressing change, lab draws, med education I have personally performed a face to face diagnostic evaluation on this patient. I have reviewed the ACP note and agree with the history, exam and plan of care, except as noted.

## 2024-06-17 NOTE — ED ADULT TRIAGE NOTE - ESI TRIAGE ACUITY LEVEL, MLM
BP Cuff and Home Monitoring  Patient meets criteria for home monitoring of blood pressure post discharge.  Reason: past medical history of gestational hypertension, . Met with patient to assess for availability of home BP monitor.   Patient does not have access to BP monitor at home.  Pt agreed to order home BP monitor from [employer].   Large BP monitor delivered to room.. Patient educated on importance of continuing to monitor BP at home, recording BP on home monitoring log and s/sx of when to call her provider.  Pt verbalized understanding the above information.     4

## 2024-09-28 NOTE — ED STATDOCS - MDM ORDERS SUBMITTED SELECTION
Brief Intervention and Referral to Treatment Summary    Patient was provided PHQ-9, AUDIT-C and DAST Screening:      PHQ-9 Score: 2  AUDIT-C Score:  12  DAST Score:  5    Patient’s substance use is considered     Dependent      Patient’s depression is considered:     Minimal    Brief Education Was Provided    Patient was receptive      Brief Intervention Is Provided (Only for AUDIT-C or DAST)     Patient reports readiness to decrease and/or stop use and a plan was discussed         Injured Trauma Survivor Screening  1.  When you were injured or right afterward   Did you think you were going to die? RESPONSES; YES+1/NO: NO  Do you think this was done to you intentionally? NO    Since your injury  Have you felt more restless, tense or jumpy than usual? RESPONSES; YES+1/NO: NO  Have you found yourself unable to stop worrying? RESPONSES; YES+1/NO: NO  Do you find yourself thinking that the world is unsafe and that people are not to be trusted? RESPONSES; YES+1/NO: NO    TOTAL SCORE from ITSS Questions 1 and 2: 0  NOTE: A score of greater than or equal to 2 is considered positive for PTSD risk and is to receive a community resource packet to link with appropriate providers.    Recommendations/Referrals for Brief and/or Specialized Treatment Provided to Patient:     
Medications

## 2025-07-29 ENCOUNTER — EMERGENCY (EMERGENCY)
Facility: HOSPITAL | Age: 40
LOS: 1 days | End: 2025-07-29
Attending: STUDENT IN AN ORGANIZED HEALTH CARE EDUCATION/TRAINING PROGRAM
Payer: MEDICAID

## 2025-07-29 VITALS
HEART RATE: 76 BPM | RESPIRATION RATE: 18 BRPM | DIASTOLIC BLOOD PRESSURE: 79 MMHG | OXYGEN SATURATION: 98 % | SYSTOLIC BLOOD PRESSURE: 124 MMHG | WEIGHT: 229.94 LBS | TEMPERATURE: 99 F

## 2025-07-29 PROCEDURE — 10060 I&D ABSCESS SIMPLE/SINGLE: CPT

## 2025-07-29 PROCEDURE — 99282 EMERGENCY DEPT VISIT SF MDM: CPT | Mod: 25

## 2025-07-29 PROCEDURE — 99283 EMERGENCY DEPT VISIT LOW MDM: CPT | Mod: 25

## 2025-07-29 RX ORDER — SULFAMETHOXAZOLE/TRIMETHOPRIM 800-160 MG
1 TABLET ORAL
Qty: 14 | Refills: 0
Start: 2025-07-29 | End: 2025-08-04

## (undated) DEVICE — PACK PERI GYN

## (undated) DEVICE — LIGASURE MARYLAND 37CM

## (undated) DEVICE — DRSG STERISTRIPS 0.5 X 4"

## (undated) DEVICE — PACK GENERAL LAPAROSCOPY

## (undated) DEVICE — TUBING OLYMPUS INSUFFLATION

## (undated) DEVICE — BLADE SURGICAL #15 CARBON

## (undated) DEVICE — POSITIONER STRAP ARMBOARD VELCRO TS-30

## (undated) DEVICE — DRSG KERLIX ROLL 4.5"

## (undated) DEVICE — GLV 7 PROTEXIS (WHITE)

## (undated) DEVICE — TRAP SPECIMEN SPUTUM 40CC

## (undated) DEVICE — DRSG TEGADERM 2.5X3"

## (undated) DEVICE — SOL IRR BAG NS 0.9% 3000ML

## (undated) DEVICE — BASIN SET SINGLE

## (undated) DEVICE — LIGASURE BLUNT TIP 37CM

## (undated) DEVICE — SMOKE EVACUTATION SYS LAPROSCOPIC AC/PA

## (undated) DEVICE — TROCAR COVIDIEN VERSAPORT BLADELESS OPTICAL 5MM STANDARD

## (undated) DEVICE — PROTECTOR HEEL / ELBOW FLUFFY

## (undated) DEVICE — TIP METZENBAUM SCISSOR MONOPOLAR ENDOCUT (ORANGE)

## (undated) DEVICE — SUT MONOCRYL 4-0 27" PS-2 UNDYED

## (undated) DEVICE — SUT VICRYL 0 27" UR-6

## (undated) DEVICE — PREP BETADINE SPONGE STICKS

## (undated) DEVICE — ENDOCATCH 10MM SPECIMEN POUCH

## (undated) DEVICE — FOLEY TRAY 16FR 5CC LF UMETER CLOSED

## (undated) DEVICE — VENODYNE/SCD SLEEVE CALF MEDIUM

## (undated) DEVICE — TUBING SUCTION NONCONDUCTIVE 6MM X 12FT

## (undated) DEVICE — TROCAR COVIDIEN VERSAONE BLADELESS FIXATION 11MM STANDARD

## (undated) DEVICE — INSUFFLATION NDL COVIDIEN SURGINEEDLE VERESS 120MM

## (undated) DEVICE — TUBING HYDRO-SURG PLUS IRRIGATOR W SMOKEVAC & PROBE

## (undated) DEVICE — TROCAR ETHICON ENDOPATH XCEL BLADELESS 5MM X 100MM STABILITY

## (undated) DEVICE — GLV 6.5 PROTEXIS (WHITE)

## (undated) DEVICE — DRSG DERMABOND MINI

## (undated) DEVICE — TROCAR COVIDIEN VERSAONE BLUNT TIP HASSAN 12MM

## (undated) DEVICE — POSITIONER FOAM EGG CRATE ULNAR 2PCS (PINK)